# Patient Record
Sex: FEMALE | Race: BLACK OR AFRICAN AMERICAN | NOT HISPANIC OR LATINO
[De-identification: names, ages, dates, MRNs, and addresses within clinical notes are randomized per-mention and may not be internally consistent; named-entity substitution may affect disease eponyms.]

---

## 2022-03-09 ENCOUNTER — APPOINTMENT (OUTPATIENT)
Dept: ANTEPARTUM | Facility: CLINIC | Age: 42
End: 2022-03-09

## 2022-03-09 PROBLEM — Z00.00 ENCOUNTER FOR PREVENTIVE HEALTH EXAMINATION: Status: ACTIVE | Noted: 2022-03-09

## 2022-03-18 ENCOUNTER — ASOB RESULT (OUTPATIENT)
Age: 42
End: 2022-03-18

## 2022-03-18 ENCOUNTER — APPOINTMENT (OUTPATIENT)
Dept: ANTEPARTUM | Facility: CLINIC | Age: 42
End: 2022-03-18
Payer: COMMERCIAL

## 2022-03-18 ENCOUNTER — APPOINTMENT (OUTPATIENT)
Dept: ANTEPARTUM | Facility: CLINIC | Age: 42
End: 2022-03-18

## 2022-03-18 DIAGNOSIS — O28.3 ABNORMAL ULTRASONIC FINDING ON ANTENATAL SCREENING OF MOTHER: ICD-10-CM

## 2022-03-18 PROCEDURE — 76811 OB US DETAILED SNGL FETUS: CPT

## 2022-03-18 PROCEDURE — 99202 OFFICE O/P NEW SF 15 MIN: CPT | Mod: 25

## 2022-03-24 ENCOUNTER — OUTPATIENT (OUTPATIENT)
Dept: OUTPATIENT SERVICES | Age: 42
LOS: 1 days | Discharge: HOME | End: 2022-03-24

## 2022-03-29 ENCOUNTER — APPOINTMENT (OUTPATIENT)
Dept: MATERNAL FETAL MEDICINE | Facility: CLINIC | Age: 42
End: 2022-03-29

## 2022-03-30 ENCOUNTER — APPOINTMENT (OUTPATIENT)
Dept: PEDIATRIC CARDIOLOGY | Facility: CLINIC | Age: 42
End: 2022-03-30

## 2022-03-31 ENCOUNTER — OUTPATIENT (OUTPATIENT)
Dept: OUTPATIENT SERVICES | Age: 42
LOS: 1 days | Discharge: HOME | End: 2022-03-31

## 2022-04-06 ENCOUNTER — APPOINTMENT (OUTPATIENT)
Dept: PEDIATRIC CARDIOLOGY | Facility: CLINIC | Age: 42
End: 2022-04-06
Payer: COMMERCIAL

## 2022-04-06 PROCEDURE — 99241 OFFICE CONSULTATION NEW/ESTAB PATIENT 15 MIN: CPT | Mod: 25

## 2022-04-06 PROCEDURE — 76820 UMBILICAL ARTERY ECHO: CPT

## 2022-04-06 PROCEDURE — 76825 ECHO EXAM OF FETAL HEART: CPT

## 2022-04-06 PROCEDURE — 76821 MIDDLE CEREBRAL ARTERY ECHO: CPT

## 2022-04-06 PROCEDURE — 76827 ECHO EXAM OF FETAL HEART: CPT

## 2022-04-06 PROCEDURE — 93325 DOPPLER ECHO COLOR FLOW MAPG: CPT | Mod: 59

## 2022-04-18 ENCOUNTER — APPOINTMENT (OUTPATIENT)
Dept: ANTEPARTUM | Facility: CLINIC | Age: 42
End: 2022-04-18
Payer: COMMERCIAL

## 2022-04-18 ENCOUNTER — ASOB RESULT (OUTPATIENT)
Age: 42
End: 2022-04-18

## 2022-04-18 PROCEDURE — 76819 FETAL BIOPHYS PROFIL W/O NST: CPT

## 2022-04-18 PROCEDURE — 76816 OB US FOLLOW-UP PER FETUS: CPT

## 2022-05-16 ENCOUNTER — ASOB RESULT (OUTPATIENT)
Age: 42
End: 2022-05-16

## 2022-05-16 ENCOUNTER — APPOINTMENT (OUTPATIENT)
Dept: ANTEPARTUM | Facility: CLINIC | Age: 42
End: 2022-05-16
Payer: COMMERCIAL

## 2022-05-16 PROCEDURE — 76819 FETAL BIOPHYS PROFIL W/O NST: CPT

## 2022-05-16 PROCEDURE — 76816 OB US FOLLOW-UP PER FETUS: CPT

## 2022-05-16 PROCEDURE — 99212 OFFICE O/P EST SF 10 MIN: CPT | Mod: 25

## 2022-05-19 ENCOUNTER — NON-APPOINTMENT (OUTPATIENT)
Age: 42
End: 2022-05-19

## 2022-05-26 ENCOUNTER — APPOINTMENT (OUTPATIENT)
Dept: ANTEPARTUM | Facility: CLINIC | Age: 42
End: 2022-05-26

## 2022-05-26 ENCOUNTER — ASOB RESULT (OUTPATIENT)
Age: 42
End: 2022-05-26

## 2022-05-26 ENCOUNTER — APPOINTMENT (OUTPATIENT)
Dept: MATERNAL FETAL MEDICINE | Facility: CLINIC | Age: 42
End: 2022-05-26
Payer: COMMERCIAL

## 2022-05-26 PROCEDURE — G0108 DIAB MANAGE TRN  PER INDIV: CPT | Mod: 95

## 2022-06-02 ENCOUNTER — OUTPATIENT (OUTPATIENT)
Dept: INPATIENT UNIT | Facility: HOSPITAL | Age: 42
LOS: 1 days | Discharge: HOME | End: 2022-06-02
Payer: MEDICAID

## 2022-06-02 ENCOUNTER — APPOINTMENT (OUTPATIENT)
Dept: ANTEPARTUM | Facility: CLINIC | Age: 42
End: 2022-06-02
Payer: COMMERCIAL

## 2022-06-02 ENCOUNTER — ASOB RESULT (OUTPATIENT)
Age: 42
End: 2022-06-02

## 2022-06-02 VITALS — DIASTOLIC BLOOD PRESSURE: 69 MMHG | HEART RATE: 74 BPM | SYSTOLIC BLOOD PRESSURE: 129 MMHG

## 2022-06-02 VITALS
HEART RATE: 96 BPM | RESPIRATION RATE: 15 BRPM | DIASTOLIC BLOOD PRESSURE: 83 MMHG | TEMPERATURE: 99 F | SYSTOLIC BLOOD PRESSURE: 136 MMHG

## 2022-06-02 DIAGNOSIS — O26.899 OTHER SPECIFIED PREGNANCY RELATED CONDITIONS, UNSPECIFIED TRIMESTER: ICD-10-CM

## 2022-06-02 DIAGNOSIS — Z3A.00 WEEKS OF GESTATION OF PREGNANCY NOT SPECIFIED: ICD-10-CM

## 2022-06-02 LAB
ALBUMIN SERPL ELPH-MCNC: 3.5 G/DL — SIGNIFICANT CHANGE UP (ref 3.3–5)
ALP SERPL-CCNC: 126 U/L — HIGH (ref 40–120)
ALT FLD-CCNC: 11 U/L — SIGNIFICANT CHANGE UP (ref 4–33)
ANION GAP SERPL CALC-SCNC: 13 MMOL/L — SIGNIFICANT CHANGE UP (ref 7–14)
APPEARANCE UR: ABNORMAL
APTT BLD: 24.5 SEC — LOW (ref 27–36.3)
AST SERPL-CCNC: 17 U/L — SIGNIFICANT CHANGE UP (ref 4–32)
BACTERIA # UR AUTO: ABNORMAL
BASOPHILS # BLD AUTO: 0.02 K/UL — SIGNIFICANT CHANGE UP (ref 0–0.2)
BASOPHILS NFR BLD AUTO: 0.2 % — SIGNIFICANT CHANGE UP (ref 0–2)
BILIRUB SERPL-MCNC: <0.2 MG/DL — SIGNIFICANT CHANGE UP (ref 0.2–1.2)
BILIRUB UR-MCNC: NEGATIVE — SIGNIFICANT CHANGE UP
BUN SERPL-MCNC: 5 MG/DL — LOW (ref 7–23)
CALCIUM SERPL-MCNC: 9.7 MG/DL — SIGNIFICANT CHANGE UP (ref 8.4–10.5)
CHLORIDE SERPL-SCNC: 102 MMOL/L — SIGNIFICANT CHANGE UP (ref 98–107)
CO2 SERPL-SCNC: 18 MMOL/L — LOW (ref 22–31)
COLOR SPEC: SIGNIFICANT CHANGE UP
CREAT ?TM UR-MCNC: 82 MG/DL — SIGNIFICANT CHANGE UP
CREAT SERPL-MCNC: 0.53 MG/DL — SIGNIFICANT CHANGE UP (ref 0.5–1.3)
DIFF PNL FLD: ABNORMAL
EGFR: 119 ML/MIN/1.73M2 — SIGNIFICANT CHANGE UP
EOSINOPHIL # BLD AUTO: 0.03 K/UL — SIGNIFICANT CHANGE UP (ref 0–0.5)
EOSINOPHIL NFR BLD AUTO: 0.3 % — SIGNIFICANT CHANGE UP (ref 0–6)
EPI CELLS # UR: 11 /HPF — HIGH (ref 0–5)
FIBRINOGEN PPP-MCNC: 905 MG/DL — HIGH (ref 330–520)
GLUCOSE SERPL-MCNC: 75 MG/DL — SIGNIFICANT CHANGE UP (ref 70–99)
GLUCOSE UR QL: NEGATIVE — SIGNIFICANT CHANGE UP
HCT VFR BLD CALC: 29 % — LOW (ref 34.5–45)
HGB BLD-MCNC: 9.3 G/DL — LOW (ref 11.5–15.5)
HYALINE CASTS # UR AUTO: 1 /LPF — SIGNIFICANT CHANGE UP (ref 0–7)
IANC: 5.85 K/UL — SIGNIFICANT CHANGE UP (ref 1.8–7.4)
IMM GRANULOCYTES NFR BLD AUTO: 0.3 % — SIGNIFICANT CHANGE UP (ref 0–1.5)
INR BLD: 0.97 RATIO — SIGNIFICANT CHANGE UP (ref 0.88–1.16)
KETONES UR-MCNC: NEGATIVE — SIGNIFICANT CHANGE UP
LDH SERPL L TO P-CCNC: 160 U/L — SIGNIFICANT CHANGE UP (ref 135–225)
LEUKOCYTE ESTERASE UR-ACNC: NEGATIVE — SIGNIFICANT CHANGE UP
LYMPHOCYTES # BLD AUTO: 2.9 K/UL — SIGNIFICANT CHANGE UP (ref 1–3.3)
LYMPHOCYTES # BLD AUTO: 29.1 % — SIGNIFICANT CHANGE UP (ref 13–44)
MCHC RBC-ENTMCNC: 26.6 PG — LOW (ref 27–34)
MCHC RBC-ENTMCNC: 32.1 GM/DL — SIGNIFICANT CHANGE UP (ref 32–36)
MCV RBC AUTO: 82.9 FL — SIGNIFICANT CHANGE UP (ref 80–100)
MONOCYTES # BLD AUTO: 1.13 K/UL — HIGH (ref 0–0.9)
MONOCYTES NFR BLD AUTO: 11.3 % — SIGNIFICANT CHANGE UP (ref 2–14)
NEUTROPHILS # BLD AUTO: 5.85 K/UL — SIGNIFICANT CHANGE UP (ref 1.8–7.4)
NEUTROPHILS NFR BLD AUTO: 58.8 % — SIGNIFICANT CHANGE UP (ref 43–77)
NITRITE UR-MCNC: NEGATIVE — SIGNIFICANT CHANGE UP
NRBC # BLD: 0 /100 WBCS — SIGNIFICANT CHANGE UP
NRBC # FLD: 0 K/UL — SIGNIFICANT CHANGE UP
PH UR: 6.5 — SIGNIFICANT CHANGE UP (ref 5–8)
PLATELET # BLD AUTO: 321 K/UL — SIGNIFICANT CHANGE UP (ref 150–400)
POTASSIUM SERPL-MCNC: 3.9 MMOL/L — SIGNIFICANT CHANGE UP (ref 3.5–5.3)
POTASSIUM SERPL-SCNC: 3.9 MMOL/L — SIGNIFICANT CHANGE UP (ref 3.5–5.3)
PROT ?TM UR-MCNC: 21 MG/DL — SIGNIFICANT CHANGE UP
PROT ?TM UR-MCNC: 21 MG/DL — SIGNIFICANT CHANGE UP
PROT SERPL-MCNC: 6.9 G/DL — SIGNIFICANT CHANGE UP (ref 6–8.3)
PROT UR-MCNC: ABNORMAL
PROT/CREAT UR-RTO: 0.3 RATIO — HIGH (ref 0–0.2)
PROTHROM AB SERPL-ACNC: 11.2 SEC — SIGNIFICANT CHANGE UP (ref 10.5–13.4)
RBC # BLD: 3.5 M/UL — LOW (ref 3.8–5.2)
RBC # FLD: 16.1 % — HIGH (ref 10.3–14.5)
RBC CASTS # UR COMP ASSIST: 4 /HPF — SIGNIFICANT CHANGE UP (ref 0–4)
SODIUM SERPL-SCNC: 133 MMOL/L — LOW (ref 135–145)
SP GR SPEC: 1.01 — SIGNIFICANT CHANGE UP (ref 1–1.05)
URATE SERPL-MCNC: 2.6 MG/DL — SIGNIFICANT CHANGE UP (ref 2.5–7)
UROBILINOGEN FLD QL: SIGNIFICANT CHANGE UP
WBC # BLD: 9.96 K/UL — SIGNIFICANT CHANGE UP (ref 3.8–10.5)
WBC # FLD AUTO: 9.96 K/UL — SIGNIFICANT CHANGE UP (ref 3.8–10.5)
WBC UR QL: 8 /HPF — HIGH (ref 0–5)

## 2022-06-02 PROCEDURE — 76818 FETAL BIOPHYS PROFILE W/NST: CPT

## 2022-06-02 PROCEDURE — 76820 UMBILICAL ARTERY ECHO: CPT

## 2022-06-02 PROCEDURE — 59025 FETAL NON-STRESS TEST: CPT | Mod: 26

## 2022-06-02 PROCEDURE — 99213 OFFICE O/P EST LOW 20 MIN: CPT | Mod: 25

## 2022-06-02 NOTE — OB PROVIDER TRIAGE NOTE - NS_FHRLATEDECEL_ALL_ALL_OB
Greater than 2 late decelerations in 30 minutes Less than or equal to 2 late decelerations in 30 minutes

## 2022-06-02 NOTE — OB PROVIDER TRIAGE NOTE - HISTORY OF PRESENT ILLNESS
40 yo , EGA@35 2/7 weeks, presented to D&T from the ATU for prolong monitoring secondary to non reactive NST and Elevated /90 at 11Am.  pt denies contractions, vaginal bleeding, leakage of fluid, and reports fetal movement.  Pt also complained of RUQ pain x 2 days 5/10. Denies fever, chills, headaches, changes in vision, chest pain, palpitations, shortness of breath, cough, nausea, vomiting, diarrhea, constipation, urinary symptoms.     Sono from ATU 22 cephalic presentation, anterior placenta AUNG 5.94, BPP   Prenatal care with Dr. Bates  Prenatal course is complicated with GDMA-1, AMA, CHTN on labetalol 200 mg 2 times/day last dose 0800am    Patient denies signs and symptoms of COVID 19; denies symptomatic illness    No adverse reactions to anesthesia, no objections to blood transfusions if clinically indicated.  OB hx: SAB x 6, 1 with D&C  Med hx: CHTN diagnose 2019, pt stated that she have not been seen by cardiology.   Surg hx: LAp ovarian Cystectomy 2019   GYN hx: denies hx of abnormal papsmearfibroids/STDs  Meds: PNV, ASA 81 mg daily, Labetalo 200 mg PO 2 times daily last dose 0800AM.   Allergies: NKDA    Social hx: Denies alcohol, tobacco, drug use  Psych hx: denies hx of anxiety/depression     40 yo , EGA@35 2/7 weeks, presented to D&T from the ATU for prolong monitoring secondary to non reactive NST and Elevated /90 at 11Am.  pt denies contractions, vaginal bleeding, leakage of fluid, and reports fetal movement.  Pt also complained of RUQ pain x 2 days 5/10. Denies fever, chills, headaches, changes in vision, chest pain, palpitations, shortness of breath, cough, nausea, vomiting, diarrhea, constipation, urinary symptoms.     Sono from ATU 22 cephalic presentation, anterior placenta AUNG 5.94, BPP   Prenatal care with Dr. Bates  Prenatal course is complicated with GDMA-1, AMA, CHTN on labetalol 200 mg 2 times/day last dose 0800am                                                       Persistent right umbilical vein- Fetal echo within normal limit 22.     Patient denies signs and symptoms of COVID 19; denies symptomatic illness    No adverse reactions to anesthesia, no objections to blood transfusions if clinically indicated.  OB hx: SAB x 6, 1 with D&C  Med hx: CHTN diagnose 2019, pt stated that she have not been seen by cardiology.   Surg hx: LAp ovarian Cystectomy 2019   GYN hx: denies hx of abnormal papsmearfibroids/STDs  Meds: PNV, ASA 81 mg daily, Labetalo 200 mg PO 2 times daily last dose 0800AM.   Allergies: NKDA    Social hx: Denies alcohol, tobacco, drug use  Psych hx: denies hx of anxiety/depression

## 2022-06-02 NOTE — OB PROVIDER TRIAGE NOTE - NSHPLABSRESULTS_GEN_ALL_CORE
CBC Full  -  ( 2022 13:23 )  WBC Count : 9.96 K/uL  RBC Count : 3.50 M/uL  Hemoglobin : 9.3 g/dL  Hematocrit : 29.0 %  Platelet Count - Automated : 321 K/uL  Mean Cell Volume : 82.9 fL  Mean Cell Hemoglobin : 26.6 pg  Mean Cell Hemoglobin Concentration : 32.1 gm/dL  Auto Neutrophil # : 5.85 K/uL  Auto Lymphocyte # : 2.90 K/uL  Auto Monocyte # : 1.13 K/uL  Auto Eosinophil # : 0.03 K/uL  Auto Basophil # : 0.02 K/uL  Auto Neutrophil % : 58.8 %  Auto Lymphocyte % : 29.1 %  Auto Monocyte % : 11.3 %  Auto Eosinophil % : 0.3 %  Auto Basophil % : 0.2 %        133<L>  |  102  |  5<L>  ----------------------------<  75  3.9   |  18<L>  |  0.53    Ca    9.7      2022 13:23    TPro  6.9  /  Alb  3.5  /  TBili  <0.2  /  DBili  x   /  AST  17  /  ALT  11  /  AlkPhos  126<H>  02    PT/INR - ( 2022 13:23 )   PT: 11.2 sec;   INR: 0.97 ratio         PTT - ( 2022 13:23 )  PTT:24.5 sec    Urinalysis Basic - ( 2022 13:23 )    Color: Light Yellow / Appearance: Slightly Turbid / S.015 / pH: x  Gluc: x / Ketone: Negative  / Bili: Negative / Urobili: <2 mg/dL   Blood: x / Protein: Trace / Nitrite: Negative   Leuk Esterase: Negative / RBC: 4 /HPF / WBC 8 /HPF   Sq Epi: x / Non Sq Epi: 11 /HPF / Bacteria: Moderate

## 2022-06-02 NOTE — OB PROVIDER TRIAGE NOTE - NSHPPHYSICALEXAM_GEN_ALL_CORE
Vital Signs Last 24 Hrs  T(C): 37.4 (02 Jun 2022 12:54), Max: 37.4 (02 Jun 2022 12:32)  T(F): 99.32 (02 Jun 2022 12:54), Max: 99.32 (02 Jun 2022 12:54)  HR: 90 (02 Jun 2022 13:41) (90 - 96)  BP: 127/70 (02 Jun 2022 13:41) (127/70 - 136/83)  RR: 15 (02 Jun 2022 12:32) (15 - 15)    Gen: NAD  Head: NC/AT  Cardio: RRR  Resp: Clear lung sound bilaterally  Abdomen: Soft, Non tender    NST-  HR baseline, moderate variability, no accelerations, no decelerations, Category 1.  [place]: no Contractions present  SSE, moderate amount of Leukorrhea, Nitrazine negative, ferning negative.   SVE: 1/30/-3 Vital Signs Last 24 Hrs  T(C): 37.4 (02 Jun 2022 12:54), Max: 37.4 (02 Jun 2022 12:32)  T(F): 99.32 (02 Jun 2022 12:54), Max: 99.32 (02 Jun 2022 12:54)  HR: 90 (02 Jun 2022 13:41) (90 - 96)  BP: 127/70 (02 Jun 2022 13:41) (127/70 - 136/83)  RR: 15 (02 Jun 2022 12:32) (15 - 15)    Gen: NAD  Head: NC/AT  Cardio: RRR  Resp: Clear lung sound bilaterally  Abdomen: Soft, Non tender  +1 lower extremity edema.    NST-  HR baseline, moderate variability, no accelerations, no decelerations, Category 1.  [place]: no Contractions present  SSE, moderate amount of Leukorrhea, Nitrazine negative, ferning negative.   SVE: 1/30/-3 Vital Signs Last 24 Hrs  T(C): 37.4 (02 Jun 2022 12:54), Max: 37.4 (02 Jun 2022 12:32)  T(F): 99.32 (02 Jun 2022 12:54), Max: 99.32 (02 Jun 2022 12:54)  HR: 90 (02 Jun 2022 13:41) (90 - 96)  BP: 127/70 (02 Jun 2022 13:41) (127/70 - 136/83)  RR: 15 (02 Jun 2022 12:32) (15 - 15)    Gen: NAD  Head: NC/AT  Cardio: RRR  Resp: Clear lung sound bilaterally  Abdomen: Soft, Non tender  +1 lower extremity edema.    NST-  HR baseline, moderate variability, accelerations 10x10, 1 variable deceleration at 1350 with quick return to baseline, Category 1.  [place]: no Contractions present  SSE, moderate amount of Leukorrhea, Nitrazine negative, ferning negative.   SVE: 1/30/-3

## 2022-06-02 NOTE — OB PROVIDER TRIAGE NOTE - NSOBPROVIDERNOTE_OBGYN_ALL_OB_FT
42 yo , EGA@35 2/7 weeks, R/O PEC, PPROM 42 yo , EGA@35 2/7 weeks, R/O PEC, PPROM  1410 40 yo , EGA@35 2/7 weeks, R/O PEC, PPROM  1410- discuss with Dr. Florence   awaiting full lab report.    1300-tracings review with Dr. Florence/JOSE J.   Discharge patient home with instructions to follow up tomorrow in triage or with Dr. Bates in OB office for repeat NST/BPP .   PEC sign and symptoms reviewed.   Labor precautions and fetal movements count were reviewed  Prior notes, lab results (prenatal chart review, prenatal labs) and recommendations were reviewed;  All ordered tests results reviewed and interpreted.  Plan of care was reviewed with patient and family; patient states understanding of the above plan. 42 yo , EGA@35 2/7 weeks, R/O supra-impose PEC, PPROM  1410- discuss with Dr. Florence   awaiting full lab report.    1300-tracings review with Dr. Florence/JOSE J.   Discharge patient home with instructions to follow up tomorrow in triage or with Dr. Bates in OB office for repeat NST/BPP .   PEC sign and symptoms reviewed.   Labor precautions and fetal movements count were reviewed  Prior notes, lab results (prenatal chart review, prenatal labs) and recommendations were reviewed;  All ordered tests results reviewed and interpreted.  Plan of care was reviewed with patient and family; patient states understanding of the above plan.

## 2022-06-02 NOTE — OB RN TRIAGE NOTE - NSICDXPASTMEDICALHX_GEN_ALL_CORE_FT
Chief complaint:   Chief Complaint   Patient presents with   • Sore Throat     x one day. emesis last night and early this morning, diarrhea this morning       Vitals:  Visit Vitals   • /60   • Pulse 84   • Temp 98.2 °F (36.8 °C) (Tympanic)   • Resp 20   • Wt 79.4 kg   • LMP 04/15/2017 (Exact Date)   • SpO2 98%       HISTORY OF PRESENT ILLNESS     HPI 18-year-old complaining of sore throat started yesterday. Had a fever, low-grade yesterday. Had some chills, had one episode of vomiting yesterday and one episode of diarrhea today. Denies abdominal pain. Complains of some pain with swallowing. Also complains of hoarseness of voice. [employer] classroom has been diagnosed with strep.   Other significant problems:  There are no active problems to display for this patient.      PAST MEDICAL, FAMILY AND SOCIAL HISTORY     Medications:  Current Outpatient Prescriptions   Medication   • topiramate (TOPAMAX) 25 MG tablet     No current facility-administered medications for this visit.        Allergies:  ALLERGIES:   Allergen Reactions   • Penicillins RASH     Hives/Rash       Past Medical  History/Surgeries:  Past Medical History:   Diagnosis Date   • Anxiety    • Depression    • Lactose intolerance     Diet can be modified to tolerate some dairy products   • Right wrist fracture    • Vision, reduced        Past Surgical History:   Procedure Laterality Date   • FINGER SURGERY  00/00/2009    R thumb   • REMOVE TONSILS/ADENOIDS,<11 Y/O  00/00/2003    T & A   • TONSILLECTOMY AND ADENOIDECTOMY         Family History:  Family History   Problem Relation Age of Onset   • Asthma     • Allergies     • Hypertension     • Seizures         Social History:  Social History   Substance Use Topics   • Smoking status: Never Smoker   • Smokeless tobacco: Never Used   • Alcohol use No       REVIEW OF SYSTEMS     Review of Systems   Constitutional: Negative for activity change, appetite change and fatigue.   HENT: Positive for  rhinorrhea, sore throat, trouble swallowing and voice change. Negative for ear pain.    Respiratory: Positive for cough. Negative for shortness of breath and wheezing.    Gastrointestinal: Negative for abdominal pain, anal bleeding and constipation.   Neurological: Negative for light-headedness and headaches.       PHYSICAL EXAM     Physical Exam   Constitutional: She appears well-developed and well-nourished. No distress.   HENT:   Right Ear: External ear normal.   Left Ear: External ear normal.   Mouth/Throat: No oropharyngeal exudate.   Oropharynx moderately inflamed. Nose mildly congested mucosa. External auditory canal, tympanic membranes are normal bilaterally   Neck: Normal range of motion. Neck supple. No thyromegaly present.   Cardiovascular: Normal rate, regular rhythm and normal heart sounds.    Pulmonary/Chest: Effort normal and breath sounds normal. No stridor. No respiratory distress. She has no wheezes. She has no rales.   Lymphadenopathy:     She has no cervical adenopathy.   Skin: She is not diaphoretic.   Nursing note and vitals reviewed.    Walk In on 05/01/2017   Component Date Value   • RAPID STREP GROUP A 05/01/2017 negative          ASSESSMENT/PLAN     Sirena was seen today for sore throat.    Diagnoses and all orders for this visit:    Acute URI  -     STREP GROUP A SCREEN RAPID WITH REFLEX TO CULTURE  -     Strep Group A Culture    Acute pharyngitis, unspecified etiology  -     STREP GROUP A SCREEN RAPID WITH REFLEX TO CULTURE  -     Strep Group A Culture           Plan:  Recommend plenty of fluids, hydration orally. Tylenol, Advil for pain and fever. Rest. Avoid undue exertion. Watch for any increasing/worsening symptoms.  Recommendation to follow with the primary care physician . If the symptoms worsen at any point, recommend patient go to the emergency room.     Dr. Vikash Yi M.D.  [facility]  [address] [address] [place] [address]  Telephone:  [phone]         PAST MEDICAL HISTORY:  Hypertension dx 2019

## 2022-06-02 NOTE — OB RN TRIAGE NOTE - NSMATERNALFETALCONCERNS_OBGYN_ALL_OB_FT
MATERNAL ALERT  AMA with primary hypertension   PRUV on ATU scan (3/18/2022)  Fany Murphy RN 3/18/2022  NORMAL FETAL ECHO

## 2022-06-02 NOTE — OB RN TRIAGE NOTE - NS_TRIAGEMEDICALSCREENINGPERFORMEDBY_OBGYN_ALL_OB_FT
CBC and blood cultures today    Flush poor    Norco 1 tablet every 4 hours as needed  
helder john/nathaniel

## 2022-06-02 NOTE — OB RN TRIAGE NOTE - FALL HARM RISK - UNIVERSAL INTERVENTIONS
Bed in lowest position, wheels locked, appropriate side rails in place/Call bell, personal items and telephone in reach/Instruct patient to call for assistance before getting out of bed or chair/Non-slip footwear when patient is out of bed/[place] to call system/Physically safe environment - no spills, clutter or unnecessary equipment/Purposeful Proactive Rounding/Room/bathroom lighting operational, light cord in reach

## 2022-06-02 NOTE — OB PROVIDER TRIAGE NOTE - ADDITIONAL INSTRUCTIONS
please drink 1-2 litter of fluid per day  follow up tomorrow in triage or with Dr. Bates in OB office for repeat NST/BPP .

## 2022-06-03 ENCOUNTER — OUTPATIENT (OUTPATIENT)
Dept: OUTPATIENT SERVICES | Facility: HOSPITAL | Age: 42
LOS: 1 days | Discharge: HOME | End: 2022-06-03

## 2022-06-03 VITALS
DIASTOLIC BLOOD PRESSURE: 78 MMHG | HEART RATE: 89 BPM | SYSTOLIC BLOOD PRESSURE: 140 MMHG | RESPIRATION RATE: 17 BRPM | TEMPERATURE: 99 F

## 2022-06-03 DIAGNOSIS — O26.899 OTHER SPECIFIED PREGNANCY RELATED CONDITIONS, UNSPECIFIED TRIMESTER: ICD-10-CM

## 2022-06-03 DIAGNOSIS — Z3A.00 WEEKS OF GESTATION OF PREGNANCY NOT SPECIFIED: ICD-10-CM

## 2022-06-03 PROCEDURE — 99213 OFFICE O/P EST LOW 20 MIN: CPT | Mod: 25

## 2022-06-03 PROCEDURE — 76818 FETAL BIOPHYS PROFILE W/NST: CPT | Mod: 26

## 2022-06-03 PROCEDURE — 59025 FETAL NON-STRESS TEST: CPT | Mod: 26,59

## 2022-06-03 PROCEDURE — 76815 OB US LIMITED FETUS(S): CPT | Mod: 26

## 2022-06-03 NOTE — OB PROVIDER TRIAGE NOTE - NSHPPHYSICALEXAM_GEN_ALL_CORE
Vital Signs Last 24 Hrs  T(C): 37 (03 Jun 2022 11:40), Max: 37 (03 Jun 2022 11:40)  T(F): 98.6 (03 Jun 2022 11:40), Max: 98.6 (03 Jun 2022 11:40)  HR: 85 (03 Jun 2022 13:52) (85 - 89)  BP: 131/69 (03 Jun 2022 13:52) (131/69 - 140/78)  RR: 17 (03 Jun 2022 11:40) (17 - 17)    Abdomen gravid, soft and nontender  NST - reactive  TAS - vtx/ant  plac/kinga 6.26. largest pocket 3.24 x2.24  BPP - 8/8

## 2022-06-03 NOTE — OB PROVIDER TRIAGE NOTE - HISTORY OF PRESENT ILLNESS
PNC Provider:  Paulo.  EDC:  7/5/2022  Patient is a 40y/o G 7  @ 35 3/7wks gest who is returning to D&T for AUNG evaluation.  Denies contractions, LOF, or vaginal bleeding.  Reports good fetal movements.    AP Course:  GDMA1, cHTN  Meds: Labetalol 200mg bid, ASA 81mg and PNV  NKDA    PMHx - cHTN, dx'ed 2019  PSHx/Gyn - Ovarian cystectectomy -  OBHx - SAB x 6; D&C x 1  SH/Psych - denies   PNC Provider:  Paulo.  EDC:  7/5/2022  Patient is a 40y/o G 7  @ 35 3/7wks gest who is returning to D&T for AUNG evaluation.  Denies contractions, LOF, or vaginal bleeding.  Reports good fetal movements.    AP Course:  GDMA1.  PRUV  Meds: Labetalol 200mg bid, ASA 81mg and PNV  NKDA    PMHx - cHTN, dx'ed 2019  PSHx/Gyn - Ovarian cystectomy   OBHx - SAB x 6; D&C x 1  SH/Psych - denies

## 2022-06-03 NOTE — OB PROVIDER TRIAGE NOTE - NSOBPROVIDERNOTE_OBGYN_ALL_OB_FT
Next PN appt. scheduled for 6/4/2022.    Discussed findings with   Plan: Next PN appt. scheduled for 6/4/2022.    Discussed findings with Dr. Florence  Plan:  patient is cleared for discharge home.  Discussed fetal movements and encouraged to keep appt.s as scheduled.

## 2022-06-06 ENCOUNTER — INPATIENT (INPATIENT)
Facility: HOSPITAL | Age: 42
LOS: 3 days | Discharge: HOME | End: 2022-06-10
Attending: OBSTETRICS & GYNECOLOGY | Admitting: OBSTETRICS & GYNECOLOGY

## 2022-06-06 ENCOUNTER — TRANSCRIPTION ENCOUNTER (OUTPATIENT)
Age: 42
End: 2022-06-06

## 2022-06-06 ENCOUNTER — APPOINTMENT (OUTPATIENT)
Dept: ANTEPARTUM | Facility: CLINIC | Age: 42
End: 2022-06-06
Payer: COMMERCIAL

## 2022-06-06 ENCOUNTER — ASOB RESULT (OUTPATIENT)
Age: 42
End: 2022-06-06

## 2022-06-06 ENCOUNTER — RESULT REVIEW (OUTPATIENT)
Age: 42
End: 2022-06-06

## 2022-06-06 VITALS
RESPIRATION RATE: 17 BRPM | DIASTOLIC BLOOD PRESSURE: 72 MMHG | SYSTOLIC BLOOD PRESSURE: 137 MMHG | TEMPERATURE: 99 F | HEART RATE: 88 BPM

## 2022-06-06 DIAGNOSIS — O41.03X0 OLIGOHYDRAMNIOS, THIRD TRIMESTER, NOT APPLICABLE OR UNSPECIFIED: ICD-10-CM

## 2022-06-06 DIAGNOSIS — O26.899 OTHER SPECIFIED PREGNANCY RELATED CONDITIONS, UNSPECIFIED TRIMESTER: ICD-10-CM

## 2022-06-06 DIAGNOSIS — Z3A.00 WEEKS OF GESTATION OF PREGNANCY NOT SPECIFIED: ICD-10-CM

## 2022-06-06 LAB
ALBUMIN SERPL ELPH-MCNC: 3.4 G/DL — SIGNIFICANT CHANGE UP (ref 3.3–5)
ALP SERPL-CCNC: 129 U/L — HIGH (ref 40–120)
ALT FLD-CCNC: 15 U/L — SIGNIFICANT CHANGE UP (ref 4–33)
ANION GAP SERPL CALC-SCNC: 13 MMOL/L — SIGNIFICANT CHANGE UP (ref 7–14)
APPEARANCE UR: ABNORMAL
APTT BLD: 25.3 SEC — LOW (ref 27–36.3)
AST SERPL-CCNC: 16 U/L — SIGNIFICANT CHANGE UP (ref 4–32)
BACTERIA # UR AUTO: ABNORMAL
BASOPHILS # BLD AUTO: 0.02 K/UL — SIGNIFICANT CHANGE UP (ref 0–0.2)
BASOPHILS NFR BLD AUTO: 0.2 % — SIGNIFICANT CHANGE UP (ref 0–2)
BILIRUB SERPL-MCNC: <0.2 MG/DL — SIGNIFICANT CHANGE UP (ref 0.2–1.2)
BILIRUB UR-MCNC: NEGATIVE — SIGNIFICANT CHANGE UP
BLD GP AB SCN SERPL QL: NEGATIVE — SIGNIFICANT CHANGE UP
BUN SERPL-MCNC: 4 MG/DL — LOW (ref 7–23)
CALCIUM SERPL-MCNC: 9.3 MG/DL — SIGNIFICANT CHANGE UP (ref 8.4–10.5)
CHLORIDE SERPL-SCNC: 104 MMOL/L — SIGNIFICANT CHANGE UP (ref 98–107)
CO2 SERPL-SCNC: 20 MMOL/L — LOW (ref 22–31)
COLLECT DURATION TIME UR: 24 HR — SIGNIFICANT CHANGE UP
COLOR SPEC: SIGNIFICANT CHANGE UP
COVID-19 SPIKE DOMAIN AB INTERP: POSITIVE
COVID-19 SPIKE DOMAIN ANTIBODY RESULT: >250 U/ML — HIGH
CREAT ?TM UR-MCNC: 73 MG/DL — SIGNIFICANT CHANGE UP
CREAT SERPL-MCNC: 0.57 MG/DL — SIGNIFICANT CHANGE UP (ref 0.5–1.3)
DIFF PNL FLD: ABNORMAL
EGFR: 117 ML/MIN/1.73M2 — SIGNIFICANT CHANGE UP
EOSINOPHIL # BLD AUTO: 0.03 K/UL — SIGNIFICANT CHANGE UP (ref 0–0.5)
EOSINOPHIL NFR BLD AUTO: 0.4 % — SIGNIFICANT CHANGE UP (ref 0–6)
EPI CELLS # UR: 16 /HPF — HIGH (ref 0–5)
FIBRINOGEN PPP-MCNC: 803 MG/DL — HIGH (ref 330–520)
GLUCOSE BLDC GLUCOMTR-MCNC: 72 MG/DL — SIGNIFICANT CHANGE UP (ref 70–99)
GLUCOSE BLDC GLUCOMTR-MCNC: 82 MG/DL — SIGNIFICANT CHANGE UP (ref 70–99)
GLUCOSE SERPL-MCNC: 69 MG/DL — LOW (ref 70–99)
GLUCOSE UR QL: NEGATIVE — SIGNIFICANT CHANGE UP
HBV SURFACE AG SERPL QL IA: SIGNIFICANT CHANGE UP
HCT VFR BLD CALC: 27.6 % — LOW (ref 34.5–45)
HGB BLD-MCNC: 8.7 G/DL — LOW (ref 11.5–15.5)
HYALINE CASTS # UR AUTO: 3 /LPF — SIGNIFICANT CHANGE UP (ref 0–7)
IANC: 4.42 K/UL — SIGNIFICANT CHANGE UP (ref 1.8–7.4)
IMM GRANULOCYTES NFR BLD AUTO: 0.4 % — SIGNIFICANT CHANGE UP (ref 0–1.5)
INR BLD: 0.98 RATIO — SIGNIFICANT CHANGE UP (ref 0.88–1.16)
KETONES UR-MCNC: ABNORMAL
LDH SERPL L TO P-CCNC: 158 U/L — SIGNIFICANT CHANGE UP (ref 135–225)
LEUKOCYTE ESTERASE UR-ACNC: NEGATIVE — SIGNIFICANT CHANGE UP
LYMPHOCYTES # BLD AUTO: 2.83 K/UL — SIGNIFICANT CHANGE UP (ref 1–3.3)
LYMPHOCYTES # BLD AUTO: 34.6 % — SIGNIFICANT CHANGE UP (ref 13–44)
MCHC RBC-ENTMCNC: 26.3 PG — LOW (ref 27–34)
MCHC RBC-ENTMCNC: 31.5 GM/DL — LOW (ref 32–36)
MCV RBC AUTO: 83.4 FL — SIGNIFICANT CHANGE UP (ref 80–100)
MONOCYTES # BLD AUTO: 0.86 K/UL — SIGNIFICANT CHANGE UP (ref 0–0.9)
MONOCYTES NFR BLD AUTO: 10.5 % — SIGNIFICANT CHANGE UP (ref 2–14)
NEUTROPHILS # BLD AUTO: 4.42 K/UL — SIGNIFICANT CHANGE UP (ref 1.8–7.4)
NEUTROPHILS NFR BLD AUTO: 53.9 % — SIGNIFICANT CHANGE UP (ref 43–77)
NITRITE UR-MCNC: NEGATIVE — SIGNIFICANT CHANGE UP
NRBC # BLD: 0 /100 WBCS — SIGNIFICANT CHANGE UP
NRBC # FLD: 0 K/UL — SIGNIFICANT CHANGE UP
PH UR: 6.5 — SIGNIFICANT CHANGE UP (ref 5–8)
PLATELET # BLD AUTO: 316 K/UL — SIGNIFICANT CHANGE UP (ref 150–400)
POTASSIUM SERPL-MCNC: 3.9 MMOL/L — SIGNIFICANT CHANGE UP (ref 3.5–5.3)
POTASSIUM SERPL-SCNC: 3.9 MMOL/L — SIGNIFICANT CHANGE UP (ref 3.5–5.3)
PROT 24H UR-MRATE: 454 MG/24 H — HIGH
PROT ?TM UR-MCNC: 26 MG/DL — SIGNIFICANT CHANGE UP
PROT ?TM UR-MCNC: 26 MG/DL — SIGNIFICANT CHANGE UP
PROT SERPL-MCNC: 6.6 G/DL — SIGNIFICANT CHANGE UP (ref 6–8.3)
PROT UR-MCNC: ABNORMAL
PROT/CREAT UR-RTO: 0.4 RATIO — HIGH (ref 0–0.2)
PROTHROM AB SERPL-ACNC: 11.4 SEC — SIGNIFICANT CHANGE UP (ref 10.5–13.4)
RBC # BLD: 3.31 M/UL — LOW (ref 3.8–5.2)
RBC # FLD: 16.3 % — HIGH (ref 10.3–14.5)
RBC CASTS # UR COMP ASSIST: 2 /HPF — SIGNIFICANT CHANGE UP (ref 0–4)
RH IG SCN BLD-IMP: POSITIVE — SIGNIFICANT CHANGE UP
RH IG SCN BLD-IMP: POSITIVE — SIGNIFICANT CHANGE UP
SARS-COV-2 IGG+IGM SERPL QL IA: >250 U/ML — HIGH
SARS-COV-2 IGG+IGM SERPL QL IA: POSITIVE
SARS-COV-2 RNA SPEC QL NAA+PROBE: SIGNIFICANT CHANGE UP
SODIUM SERPL-SCNC: 137 MMOL/L — SIGNIFICANT CHANGE UP (ref 135–145)
SP GR SPEC: 1.01 — SIGNIFICANT CHANGE UP (ref 1–1.05)
TOTAL VOLUME - 24 HOUR: 2100 ML — SIGNIFICANT CHANGE UP
URATE SERPL-MCNC: 3.1 MG/DL — SIGNIFICANT CHANGE UP (ref 2.5–7)
URINE CREATININE CALCULATION: 1.4 G/24 H — SIGNIFICANT CHANGE UP (ref 0.6–1.6)
UROBILINOGEN FLD QL: SIGNIFICANT CHANGE UP
WBC # BLD: 8.19 K/UL — SIGNIFICANT CHANGE UP (ref 3.8–10.5)
WBC # FLD AUTO: 8.19 K/UL — SIGNIFICANT CHANGE UP (ref 3.8–10.5)
WBC UR QL: 5 /HPF — SIGNIFICANT CHANGE UP (ref 0–5)

## 2022-06-06 PROCEDURE — 76820 UMBILICAL ARTERY ECHO: CPT

## 2022-06-06 PROCEDURE — 88307 TISSUE EXAM BY PATHOLOGIST: CPT | Mod: 26

## 2022-06-06 PROCEDURE — 76816 OB US FOLLOW-UP PER FETUS: CPT

## 2022-06-06 PROCEDURE — 88305 TISSUE EXAM BY PATHOLOGIST: CPT | Mod: 26

## 2022-06-06 PROCEDURE — 76818 FETAL BIOPHYS PROFILE W/NST: CPT

## 2022-06-06 PROCEDURE — 99213 OFFICE O/P EST LOW 20 MIN: CPT | Mod: 25

## 2022-06-06 DEVICE — SURGICEL SNOW 2 X 4": Type: IMPLANTABLE DEVICE | Status: FUNCTIONAL

## 2022-06-06 RX ORDER — ACETAMINOPHEN 500 MG
975 TABLET ORAL
Refills: 0 | Status: DISCONTINUED | OUTPATIENT
Start: 2022-06-07 | End: 2022-06-10

## 2022-06-06 RX ORDER — KETOROLAC TROMETHAMINE 30 MG/ML
30 SYRINGE (ML) INJECTION EVERY 6 HOURS
Refills: 0 | Status: DISCONTINUED | OUTPATIENT
Start: 2022-06-07 | End: 2022-06-07

## 2022-06-06 RX ORDER — MAGNESIUM HYDROXIDE 400 MG/1
30 TABLET, CHEWABLE ORAL
Refills: 0 | Status: DISCONTINUED | OUTPATIENT
Start: 2022-06-07 | End: 2022-06-10

## 2022-06-06 RX ORDER — LABETALOL HCL 100 MG
20 TABLET ORAL ONCE
Refills: 0 | Status: COMPLETED | OUTPATIENT
Start: 2022-06-06 | End: 2022-06-06

## 2022-06-06 RX ORDER — AMPICILLIN TRIHYDRATE 250 MG
1 CAPSULE ORAL EVERY 4 HOURS
Refills: 0 | Status: DISCONTINUED | OUTPATIENT
Start: 2022-06-06 | End: 2022-06-06

## 2022-06-06 RX ORDER — OXYCODONE HYDROCHLORIDE 5 MG/1
5 TABLET ORAL
Refills: 0 | Status: DISCONTINUED | OUTPATIENT
Start: 2022-06-07 | End: 2022-06-10

## 2022-06-06 RX ORDER — SODIUM CHLORIDE 9 MG/ML
1000 INJECTION, SOLUTION INTRAVENOUS
Refills: 0 | Status: DISCONTINUED | OUTPATIENT
Start: 2022-06-06 | End: 2022-06-06

## 2022-06-06 RX ORDER — DIPHENHYDRAMINE HCL 50 MG
25 CAPSULE ORAL EVERY 6 HOURS
Refills: 0 | Status: DISCONTINUED | OUTPATIENT
Start: 2022-06-07 | End: 2022-06-10

## 2022-06-06 RX ORDER — NALOXONE HYDROCHLORIDE 4 MG/.1ML
0.1 SPRAY NASAL
Refills: 0 | Status: DISCONTINUED | OUTPATIENT
Start: 2022-06-07 | End: 2022-06-10

## 2022-06-06 RX ORDER — TETANUS TOXOID, REDUCED DIPHTHERIA TOXOID AND ACELLULAR PERTUSSIS VACCINE, ADSORBED 5; 2.5; 8; 8; 2.5 [IU]/.5ML; [IU]/.5ML; UG/.5ML; UG/.5ML; UG/.5ML
0.5 SUSPENSION INTRAMUSCULAR ONCE
Refills: 0 | Status: DISCONTINUED | OUTPATIENT
Start: 2022-06-07 | End: 2022-06-10

## 2022-06-06 RX ORDER — MAGNESIUM SULFATE 500 MG/ML
2 VIAL (ML) INJECTION
Qty: 40 | Refills: 0 | Status: DISCONTINUED | OUTPATIENT
Start: 2022-06-06 | End: 2022-06-06

## 2022-06-06 RX ORDER — OXYTOCIN 10 UNIT/ML
333.33 VIAL (ML) INJECTION
Qty: 20 | Refills: 0 | Status: DISCONTINUED | OUTPATIENT
Start: 2022-06-07 | End: 2022-06-08

## 2022-06-06 RX ORDER — DEXAMETHASONE 0.5 MG/5ML
4 ELIXIR ORAL EVERY 6 HOURS
Refills: 0 | Status: DISCONTINUED | OUTPATIENT
Start: 2022-06-07 | End: 2022-06-10

## 2022-06-06 RX ORDER — OXYCODONE HYDROCHLORIDE 5 MG/1
5 TABLET ORAL ONCE
Refills: 0 | Status: DISCONTINUED | OUTPATIENT
Start: 2022-06-07 | End: 2022-06-10

## 2022-06-06 RX ORDER — OXYTOCIN 10 UNIT/ML
16.67 VIAL (ML) INJECTION
Qty: 20 | Refills: 0 | Status: DISCONTINUED | OUTPATIENT
Start: 2022-06-06 | End: 2022-06-08

## 2022-06-06 RX ORDER — HEPARIN SODIUM 5000 [USP'U]/ML
5000 INJECTION INTRAVENOUS; SUBCUTANEOUS EVERY 12 HOURS
Refills: 0 | Status: DISCONTINUED | OUTPATIENT
Start: 2022-06-07 | End: 2022-06-10

## 2022-06-06 RX ORDER — SIMETHICONE 80 MG/1
80 TABLET, CHEWABLE ORAL EVERY 4 HOURS
Refills: 0 | Status: DISCONTINUED | OUTPATIENT
Start: 2022-06-07 | End: 2022-06-10

## 2022-06-06 RX ORDER — MAGNESIUM SULFATE 500 MG/ML
4 VIAL (ML) INJECTION ONCE
Refills: 0 | Status: COMPLETED | OUTPATIENT
Start: 2022-06-06 | End: 2022-06-06

## 2022-06-06 RX ORDER — OXYCODONE HYDROCHLORIDE 5 MG/1
10 TABLET ORAL
Refills: 0 | Status: DISCONTINUED | OUTPATIENT
Start: 2022-06-07 | End: 2022-06-07

## 2022-06-06 RX ORDER — LABETALOL HCL 100 MG
200 TABLET ORAL
Refills: 0 | Status: DISCONTINUED | OUTPATIENT
Start: 2022-06-06 | End: 2022-06-06

## 2022-06-06 RX ORDER — AMPICILLIN TRIHYDRATE 250 MG
2 CAPSULE ORAL ONCE
Refills: 0 | Status: COMPLETED | OUTPATIENT
Start: 2022-06-06 | End: 2022-06-06

## 2022-06-06 RX ORDER — OXYCODONE HYDROCHLORIDE 5 MG/1
5 TABLET ORAL
Refills: 0 | Status: DISCONTINUED | OUTPATIENT
Start: 2022-06-07 | End: 2022-06-08

## 2022-06-06 RX ORDER — LANOLIN
1 OINTMENT (GRAM) TOPICAL EVERY 6 HOURS
Refills: 0 | Status: DISCONTINUED | OUTPATIENT
Start: 2022-06-07 | End: 2022-06-10

## 2022-06-06 RX ORDER — NALBUPHINE HYDROCHLORIDE 10 MG/ML
2.5 INJECTION, SOLUTION INTRAMUSCULAR; INTRAVENOUS; SUBCUTANEOUS EVERY 6 HOURS
Refills: 0 | Status: DISCONTINUED | OUTPATIENT
Start: 2022-06-07 | End: 2022-06-10

## 2022-06-06 RX ORDER — ONDANSETRON 8 MG/1
4 TABLET, FILM COATED ORAL EVERY 6 HOURS
Refills: 0 | Status: DISCONTINUED | OUTPATIENT
Start: 2022-06-07 | End: 2022-06-10

## 2022-06-06 RX ORDER — IBUPROFEN 200 MG
600 TABLET ORAL EVERY 6 HOURS
Refills: 0 | Status: COMPLETED | OUTPATIENT
Start: 2022-06-07 | End: 2023-05-06

## 2022-06-06 RX ORDER — SODIUM CHLORIDE 9 MG/ML
3 INJECTION INTRAMUSCULAR; INTRAVENOUS; SUBCUTANEOUS EVERY 8 HOURS
Refills: 0 | Status: DISCONTINUED | OUTPATIENT
Start: 2022-06-06 | End: 2022-06-06

## 2022-06-06 RX ADMIN — Medication 300 GRAM(S): at 18:06

## 2022-06-06 RX ADMIN — Medication 108 GRAM(S): at 18:42

## 2022-06-06 RX ADMIN — Medication 216 GRAM(S): at 14:55

## 2022-06-06 RX ADMIN — Medication 200 MILLIGRAM(S): at 20:03

## 2022-06-06 RX ADMIN — Medication 50 GM/HR: at 19:10

## 2022-06-06 RX ADMIN — Medication 20 MILLIGRAM(S): at 17:58

## 2022-06-06 RX ADMIN — Medication 50 GM/HR: at 18:32

## 2022-06-06 NOTE — OB PROVIDER LABOR PROGRESS NOTE - NS_OBIHIFHRDETAILS_OBGYN_ALL_OB_FT
145, mod, - accels, recurrent late decels
Cat 1: 145/mod variability/ - accels/ - decels
Cat 1: 145/mod variability/ + accels/- decel

## 2022-06-06 NOTE — OB NEONATOLOGY/PEDIATRICIAN DELIVERY SUMMARY - NSMATERNALFETALCONCERNS_OBGYN_ALL_OB_FT
MATERNAL ALERT  AMA with primary hypertension   PRUV on ATU scan (3/18/2022)  Fany Murphy RN 3/18/2022  NORMAL FETAL ECHO     Show Quadrant Variables In The Stage Tabs: Yes

## 2022-06-06 NOTE — OB NEONATOLOGY/PEDIATRICIAN DELIVERY SUMMARY - NSPEDSNEONOTESA_OBGYN_ALL_OB_FT
Baby is a 35+6 wk GA Male born to a 42 y/o  mother via C/S for anhydramnios. Maternal history notable for cHTN (on labetalol 200 BID), PEC (s/p Mag x2), GDMA1, AMA (on Aspirin), SAB x6. Prenatal history notable for IUGR (<10th %ile), oligohydramnios (AUNG=0, BPP 8/8) , NRFHT. Maternal BT O+. PNL neg, NR, and Rubella pending. GBS unknown, treated with Amp x2 >2h prior to delivery. AROM at delivery, clear fluids. Baby born crying spontaneously. WDSS. Apgars 7/9. EOS 0.05. Mom plans to breastfeed, would like hepB vaccination for child. COVID status neg.     Physical Exam (Post-Delivery)  Gen: NAD; well-appearing  HEENT: NC/AT; anterior fontanelle open and flat; ears and nose clinically patent, normally set; no tags, no cleft palate appreciated  Skin: pink, warm, well-perfused, no rash; +congenital dermal melanocytosis  Resp: non-labored breathing  Abd: soft, NT/ND; no masses appreciated, umbilical cord with 3 vessels  Extremities: moving all extremities, no crepitus; hips negative O/B  MSK: no clavicular fracture appreciated  : Male Mike I; no abnormalities; anus patent  Back: no sacral dimple  Neuro: +gertrude, +babinski, grasp, good tone throughout

## 2022-06-06 NOTE — OB PROVIDER LABOR PROGRESS NOTE - ASSESSMENT
40yo @35+6 IOL for anhydramnios and IUGR AC5%    - SVE: 0/50/-3  - CB attempted unsuccessfully. Cervix midline and low, will re-attempt when 0.5-1cm  - FHT Cat 1, reassuring, ctm  - c/w BC    d/w Dr. Yosef Joe, PGY1  
42yo  IOL for anhydramnios and IUGR    - SVE: 0.5/50/-3  - CB placed at 5:30p  - c/w buccal cytotec  - Repeat BP 15min after severe of 160/71. If still severe range, will start Mg and give IVP of BP medication    d/w Dr. Yosef Joe, PGY1
IOL Anhydramnios, IUGR, siPEC    - CB removed 2/2 category 2 tracing  - continue resuscitation    SABINE Bird, PGY-3  d/w Dr. Navas

## 2022-06-06 NOTE — OB PROVIDER TRIAGE NOTE - HISTORY OF PRESENT ILLNESS
42yo Black female  @ 35.6 wks SLIUP GDMA1 with hx CHTN since 2019 followed by ATU for persistent low AUNG here from ATU for evaluation of non-rx NST. Pt reports GFM, denies LOF/ VB/ ctx's. Pt has been here for rule out PPROM.     Pmhx-CHTN since 2019  Pshx/Hos-DVC x 1; ovarian cystectomy  Meds-PNV; labetalol 200mg BID; ASA 81 mg  NKDA  Gyn-ovarian cystectomy  Past ob-SAB x 6 with DVC x 1  Soc-denies

## 2022-06-06 NOTE — OB PROVIDER TRIAGE NOTE - NSHPPHYSICALEXAM_GEN_ALL_CORE
Gen: A&O x 3; NAD  Vitals: BP-137/72; 136/75; 145/77    Pulm-CTA B/L; no wheezes  Cor-clear S1S2  Abd exam-soft and nontender    NST cat II with 140 baseline with mod variability; +deceleration {reviewed by Dr Nunes}    ATU sono-vtx; R lateral placenta; AUNG-4.15 (largest pocket 4.15); 2339g (5#3; 10%-ile); 8/8 BPP    13:27-Dr Nunes and Dr Hirschberg in to see pt. Rpt sono reveals oligohydramnios with no 2 x 2 pocket

## 2022-06-06 NOTE — OB RN INTRAOPERATIVE NOTE - NSSELHIDDEN_OBGYN_ALL_OB_FT
[NS_[email or web address]_ALL_OB_FT:[id number]],[NS_DeliveryAssist1_OBGYN_ALL_OB_FT:[id number]],[NS_DeliveryRN_OBGYN_ALL_OB_FT:[id number]]

## 2022-06-06 NOTE — OB RN DELIVERY SUMMARY - NSSELHIDDEN_OBGYN_ALL_OB_FT
[NS_[email or web address]_ALL_OB_FT:[id number]],[NS_DeliveryAssist1_OBGYN_ALL_OB_FT:[id number]],[NS_DeliveryRN_OBGYN_ALL_OB_FT:[id number]=]

## 2022-06-06 NOTE — OB PROVIDER LABOR PROGRESS NOTE - NS_SUBJECTIVE/OBJECTIVE_OBGYN_ALL_OB_FT
patient examined for cat 2 tracing
CB placed successfully. During placement patient had a severe 182/79 which was likely from pain. However repeat BP a few minutes later still elevated at 160/71.
Cervical balloon induction explained to patient who understands and accepts. Cervical balloon attempted unsuccessfully. Patient received BC#1 30 min ago

## 2022-06-06 NOTE — OB RN TRIAGE NOTE - FALL HARM RISK - UNIVERSAL INTERVENTIONS
Bed in lowest position, wheels locked, appropriate side rails in place/Call bell, personal items and telephone in reach/Instruct patient to call for assistance before getting out of bed or chair/Non-slip footwear when patient is out of bed/[place] to call system/Physically safe environment - no spills, clutter or unnecessary equipment/Purposeful Proactive Rounding/Room/bathroom lighting operational, light cord in reach

## 2022-06-06 NOTE — OB PROVIDER H&P - ASSESSMENT
40yo Black female  @ 35.6 wks SLIUP GDMA1 with CHTN here with oligohydramnios and cat II NST  -pt was dw Dr Nunes of [facility] and Dr Navas  -pt was ordered for HELLP labs; pt brought her 24h urine that was sent to lab  -pt was dw Dr Navas  -pt for buccal cytotec  -pt is  with unknown GBS->pt ordered for ampicillin  -Dr Bates office called for records  -pt was swabbed for covid-19   40yo Black female  @ 35.6 wks SLIUP GDMA1 with CHTN here with oligohydramnios and cat II NST  -pt was dw Dr Nunes of [facility] and Dr Navas  -pt was ordered for HELLP labs; pt brought her 24h urine that was sent to lab  -pt was dw Dr Navas  -pt for buccal cytotec  -pt is  with unknown GBS->pt ordered for ampicillin  -Dr Bates office called for records  -pt was swabbed for covid-19    Ob attending    for admission and delivery per [facility]  buccal cytotec with cervical balloon when able  pain management  ampicillin  labetalol 200 mg BID    Becky CUELLAR

## 2022-06-06 NOTE — OB RN DELIVERY SUMMARY - NS_SEPSISRSKCALC_OBGYN_ALL_OB_FT
EOS calculated successfully. EOS Risk Factor: 0.5/1000 live births ([facility] national incidence); GA=35w6d; Temp=98.8; ROM=0.033; GBS='Unknown'; Antibiotics='GBS specific antibiotics > 2 hrs prior to birth'

## 2022-06-06 NOTE — OB PROVIDER TRIAGE NOTE - NSOBPROVIDERNOTE_OBGYN_ALL_OB_FT
42yo Black female  @ 35.6 wks SLIUP GDMA1 with CHTN here with oligohydramnios and cat II NST  -pt was dw Dr Nunes of [facility] and Dr Navas  -pt was ordered for HELLP labs; pt brought her 24h urine that was sent to lab  -pt was dw Dr Navas  -pt for buccal cytotec  -pt is  with unknown GBS->pt ordered for ampicillin  -Dr Bates office called for records  -pt was swabbed for covid-19

## 2022-06-06 NOTE — OB RN PATIENT PROFILE - FALL HARM RISK - UNIVERSAL INTERVENTIONS
Bed in lowest position, wheels locked, appropriate side rails in place/Call bell, personal items and telephone in reach/Instruct patient to call for assistance before getting out of bed or chair/Non-slip footwear when patient is out of bed/[place] to call system/Physically safe environment - no spills, clutter or unnecessary equipment/Purposeful Proactive Rounding/Room/bathroom lighting operational, light cord in reach

## 2022-06-07 LAB
ALBUMIN SERPL ELPH-MCNC: 2.8 G/DL — LOW (ref 3.3–5)
ALBUMIN SERPL ELPH-MCNC: 2.8 G/DL — LOW (ref 3.3–5)
ALP SERPL-CCNC: 108 U/L — SIGNIFICANT CHANGE UP (ref 40–120)
ALP SERPL-CCNC: 115 U/L — SIGNIFICANT CHANGE UP (ref 40–120)
ALT FLD-CCNC: 7 U/L — SIGNIFICANT CHANGE UP (ref 4–33)
ALT FLD-CCNC: 8 U/L — SIGNIFICANT CHANGE UP (ref 4–33)
ANION GAP SERPL CALC-SCNC: 10 MMOL/L — SIGNIFICANT CHANGE UP (ref 7–14)
ANION GAP SERPL CALC-SCNC: 12 MMOL/L — SIGNIFICANT CHANGE UP (ref 7–14)
APTT BLD: 23.6 SEC — LOW (ref 27–36.3)
APTT BLD: 24.3 SEC — LOW (ref 27–36.3)
AST SERPL-CCNC: 19 U/L — SIGNIFICANT CHANGE UP (ref 4–32)
AST SERPL-CCNC: 20 U/L — SIGNIFICANT CHANGE UP (ref 4–32)
BASOPHILS # BLD AUTO: 0.01 K/UL — SIGNIFICANT CHANGE UP (ref 0–0.2)
BASOPHILS # BLD AUTO: 0.03 K/UL — SIGNIFICANT CHANGE UP (ref 0–0.2)
BASOPHILS # BLD AUTO: 0.05 K/UL — SIGNIFICANT CHANGE UP (ref 0–0.2)
BASOPHILS NFR BLD AUTO: 0.1 % — SIGNIFICANT CHANGE UP (ref 0–2)
BASOPHILS NFR BLD AUTO: 0.2 % — SIGNIFICANT CHANGE UP (ref 0–2)
BASOPHILS NFR BLD AUTO: 0.3 % — SIGNIFICANT CHANGE UP (ref 0–2)
BILIRUB SERPL-MCNC: 0.2 MG/DL — SIGNIFICANT CHANGE UP (ref 0.2–1.2)
BILIRUB SERPL-MCNC: 0.3 MG/DL — SIGNIFICANT CHANGE UP (ref 0.2–1.2)
BUN SERPL-MCNC: 3 MG/DL — LOW (ref 7–23)
BUN SERPL-MCNC: 3 MG/DL — LOW (ref 7–23)
CALCIUM SERPL-MCNC: 7.7 MG/DL — LOW (ref 8.4–10.5)
CALCIUM SERPL-MCNC: 8.3 MG/DL — LOW (ref 8.4–10.5)
CHLORIDE SERPL-SCNC: 104 MMOL/L — SIGNIFICANT CHANGE UP (ref 98–107)
CHLORIDE SERPL-SCNC: 99 MMOL/L — SIGNIFICANT CHANGE UP (ref 98–107)
CO2 SERPL-SCNC: 20 MMOL/L — LOW (ref 22–31)
CO2 SERPL-SCNC: 20 MMOL/L — LOW (ref 22–31)
CREAT SERPL-MCNC: 0.53 MG/DL — SIGNIFICANT CHANGE UP (ref 0.5–1.3)
CREAT SERPL-MCNC: 0.57 MG/DL — SIGNIFICANT CHANGE UP (ref 0.5–1.3)
EGFR: 117 ML/MIN/1.73M2 — SIGNIFICANT CHANGE UP
EGFR: 119 ML/MIN/1.73M2 — SIGNIFICANT CHANGE UP
EOSINOPHIL # BLD AUTO: 0.03 K/UL — SIGNIFICANT CHANGE UP (ref 0–0.5)
EOSINOPHIL # BLD AUTO: 0.03 K/UL — SIGNIFICANT CHANGE UP (ref 0–0.5)
EOSINOPHIL # BLD AUTO: 0.04 K/UL — SIGNIFICANT CHANGE UP (ref 0–0.5)
EOSINOPHIL NFR BLD AUTO: 0.2 % — SIGNIFICANT CHANGE UP (ref 0–6)
EOSINOPHIL NFR BLD AUTO: 0.3 % — SIGNIFICANT CHANGE UP (ref 0–6)
EOSINOPHIL NFR BLD AUTO: 0.3 % — SIGNIFICANT CHANGE UP (ref 0–6)
FIBRINOGEN PPP-MCNC: 722 MG/DL — HIGH (ref 330–520)
FIBRINOGEN PPP-MCNC: 737 MG/DL — HIGH (ref 330–520)
GLUCOSE SERPL-MCNC: 128 MG/DL — HIGH (ref 70–99)
GLUCOSE SERPL-MCNC: 93 MG/DL — SIGNIFICANT CHANGE UP (ref 70–99)
HCT VFR BLD CALC: 20.9 % — CRITICAL LOW (ref 34.5–45)
HCT VFR BLD CALC: 22.5 % — LOW (ref 34.5–45)
HCT VFR BLD CALC: 27.2 % — LOW (ref 34.5–45)
HGB BLD-MCNC: 6.5 G/DL — CRITICAL LOW (ref 11.5–15.5)
HGB BLD-MCNC: 7.1 G/DL — LOW (ref 11.5–15.5)
HGB BLD-MCNC: 8.2 G/DL — LOW (ref 11.5–15.5)
IANC: 12.31 K/UL — HIGH (ref 1.8–7.4)
IANC: 8.16 K/UL — HIGH (ref 1.8–7.4)
IANC: 9.11 K/UL — HIGH (ref 1.8–7.4)
IMM GRANULOCYTES NFR BLD AUTO: 0.3 % — SIGNIFICANT CHANGE UP (ref 0–1.5)
IMM GRANULOCYTES NFR BLD AUTO: 0.5 % — SIGNIFICANT CHANGE UP (ref 0–1.5)
IMM GRANULOCYTES NFR BLD AUTO: 0.7 % — SIGNIFICANT CHANGE UP (ref 0–1.5)
INR BLD: 0.95 RATIO — SIGNIFICANT CHANGE UP (ref 0.88–1.16)
INR BLD: 0.97 RATIO — SIGNIFICANT CHANGE UP (ref 0.88–1.16)
LDH SERPL L TO P-CCNC: 230 U/L — HIGH (ref 135–225)
LDH SERPL L TO P-CCNC: 239 U/L — HIGH (ref 135–225)
LYMPHOCYTES # BLD AUTO: 1.81 K/UL — SIGNIFICANT CHANGE UP (ref 1–3.3)
LYMPHOCYTES # BLD AUTO: 15.3 % — SIGNIFICANT CHANGE UP (ref 13–44)
LYMPHOCYTES # BLD AUTO: 16.4 % — SIGNIFICANT CHANGE UP (ref 13–44)
LYMPHOCYTES # BLD AUTO: 2.67 K/UL — SIGNIFICANT CHANGE UP (ref 1–3.3)
LYMPHOCYTES # BLD AUTO: 25.4 % — SIGNIFICANT CHANGE UP (ref 13–44)
LYMPHOCYTES # BLD AUTO: 3.1 K/UL — SIGNIFICANT CHANGE UP (ref 1–3.3)
MAGNESIUM SERPL-MCNC: 3.2 MG/DL — HIGH (ref 1.6–2.6)
MAGNESIUM SERPL-MCNC: 3.2 MG/DL — HIGH (ref 1.6–2.6)
MAGNESIUM SERPL-MCNC: 4.5 MG/DL — HIGH (ref 1.6–2.6)
MAGNESIUM SERPL-MCNC: 4.6 MG/DL — HIGH (ref 1.6–2.6)
MAGNESIUM SERPL-MCNC: 5.2 MG/DL — HIGH (ref 1.6–2.6)
MCHC RBC-ENTMCNC: 26.4 PG — LOW (ref 27–34)
MCHC RBC-ENTMCNC: 26.5 PG — LOW (ref 27–34)
MCHC RBC-ENTMCNC: 26.9 PG — LOW (ref 27–34)
MCHC RBC-ENTMCNC: 30.1 GM/DL — LOW (ref 32–36)
MCHC RBC-ENTMCNC: 31.1 GM/DL — LOW (ref 32–36)
MCHC RBC-ENTMCNC: 31.6 GM/DL — LOW (ref 32–36)
MCV RBC AUTO: 83.6 FL — SIGNIFICANT CHANGE UP (ref 80–100)
MCV RBC AUTO: 85.3 FL — SIGNIFICANT CHANGE UP (ref 80–100)
MCV RBC AUTO: 89.2 FL — SIGNIFICANT CHANGE UP (ref 80–100)
MONOCYTES # BLD AUTO: 0.77 K/UL — SIGNIFICANT CHANGE UP (ref 0–0.9)
MONOCYTES # BLD AUTO: 0.86 K/UL — SIGNIFICANT CHANGE UP (ref 0–0.9)
MONOCYTES # BLD AUTO: 1.16 K/UL — HIGH (ref 0–0.9)
MONOCYTES NFR BLD AUTO: 6.3 % — SIGNIFICANT CHANGE UP (ref 2–14)
MONOCYTES NFR BLD AUTO: 7.1 % — SIGNIFICANT CHANGE UP (ref 2–14)
MONOCYTES NFR BLD AUTO: 7.3 % — SIGNIFICANT CHANGE UP (ref 2–14)
NEUTROPHILS # BLD AUTO: 12.31 K/UL — HIGH (ref 1.8–7.4)
NEUTROPHILS # BLD AUTO: 8.16 K/UL — HIGH (ref 1.8–7.4)
NEUTROPHILS # BLD AUTO: 9.11 K/UL — HIGH (ref 1.8–7.4)
NEUTROPHILS NFR BLD AUTO: 67.1 % — SIGNIFICANT CHANGE UP (ref 43–77)
NEUTROPHILS NFR BLD AUTO: 75.5 % — SIGNIFICANT CHANGE UP (ref 43–77)
NEUTROPHILS NFR BLD AUTO: 76.7 % — SIGNIFICANT CHANGE UP (ref 43–77)
NRBC # BLD: 0 /100 WBCS — SIGNIFICANT CHANGE UP
NRBC # FLD: 0 K/UL — SIGNIFICANT CHANGE UP
PLATELET # BLD AUTO: 278 K/UL — SIGNIFICANT CHANGE UP (ref 150–400)
PLATELET # BLD AUTO: 293 K/UL — SIGNIFICANT CHANGE UP (ref 150–400)
PLATELET # BLD AUTO: 294 K/UL — SIGNIFICANT CHANGE UP (ref 150–400)
POTASSIUM SERPL-MCNC: 3.7 MMOL/L — SIGNIFICANT CHANGE UP (ref 3.5–5.3)
POTASSIUM SERPL-MCNC: 3.9 MMOL/L — SIGNIFICANT CHANGE UP (ref 3.5–5.3)
POTASSIUM SERPL-SCNC: 3.7 MMOL/L — SIGNIFICANT CHANGE UP (ref 3.5–5.3)
POTASSIUM SERPL-SCNC: 3.9 MMOL/L — SIGNIFICANT CHANGE UP (ref 3.5–5.3)
PROT SERPL-MCNC: 5.4 G/DL — LOW (ref 6–8.3)
PROT SERPL-MCNC: 5.7 G/DL — LOW (ref 6–8.3)
PROTHROM AB SERPL-ACNC: 11 SEC — SIGNIFICANT CHANGE UP (ref 10.5–13.4)
PROTHROM AB SERPL-ACNC: 11.3 SEC — SIGNIFICANT CHANGE UP (ref 10.5–13.4)
RBC # BLD: 2.45 M/UL — LOW (ref 3.8–5.2)
RBC # BLD: 2.69 M/UL — LOW (ref 3.8–5.2)
RBC # BLD: 3.05 M/UL — LOW (ref 3.8–5.2)
RBC # FLD: 16.2 % — HIGH (ref 10.3–14.5)
RBC # FLD: 16.3 % — HIGH (ref 10.3–14.5)
RBC # FLD: 16.7 % — HIGH (ref 10.3–14.5)
RUBV IGG SER-ACNC: 2.8 INDEX — SIGNIFICANT CHANGE UP
RUBV IGG SER-IMP: POSITIVE — SIGNIFICANT CHANGE UP
SODIUM SERPL-SCNC: 129 MMOL/L — LOW (ref 135–145)
SODIUM SERPL-SCNC: 136 MMOL/L — SIGNIFICANT CHANGE UP (ref 135–145)
T PALLIDUM AB TITR SER: NEGATIVE — SIGNIFICANT CHANGE UP
URATE SERPL-MCNC: 3.3 MG/DL — SIGNIFICANT CHANGE UP (ref 2.5–7)
URATE SERPL-MCNC: 3.5 MG/DL — SIGNIFICANT CHANGE UP (ref 2.5–7)
WBC # BLD: 11.86 K/UL — HIGH (ref 3.8–10.5)
WBC # BLD: 12.19 K/UL — HIGH (ref 3.8–10.5)
WBC # BLD: 16.3 K/UL — HIGH (ref 3.8–10.5)
WBC # FLD AUTO: 11.86 K/UL — HIGH (ref 3.8–10.5)
WBC # FLD AUTO: 12.19 K/UL — HIGH (ref 3.8–10.5)
WBC # FLD AUTO: 16.3 K/UL — HIGH (ref 3.8–10.5)

## 2022-06-07 RX ORDER — LABETALOL HCL 100 MG
200 TABLET ORAL
Refills: 0 | Status: DISCONTINUED | OUTPATIENT
Start: 2022-06-07 | End: 2022-06-10

## 2022-06-07 RX ORDER — ACETAMINOPHEN 500 MG
1000 TABLET ORAL ONCE
Refills: 0 | Status: COMPLETED | OUTPATIENT
Start: 2022-06-07 | End: 2022-06-07

## 2022-06-07 RX ORDER — SODIUM CHLORIDE 9 MG/ML
1000 INJECTION, SOLUTION INTRAVENOUS
Refills: 0 | Status: DISCONTINUED | OUTPATIENT
Start: 2022-06-07 | End: 2022-06-07

## 2022-06-07 RX ORDER — MAGNESIUM SULFATE 500 MG/ML
2 VIAL (ML) INJECTION
Qty: 40 | Refills: 0 | Status: DISCONTINUED | OUTPATIENT
Start: 2022-06-07 | End: 2022-06-08

## 2022-06-07 RX ORDER — DIPHENHYDRAMINE HCL 50 MG
25 CAPSULE ORAL EVERY 6 HOURS
Refills: 0 | Status: DISCONTINUED | OUTPATIENT
Start: 2022-06-07 | End: 2022-06-10

## 2022-06-07 RX ORDER — SODIUM CHLORIDE 9 MG/ML
1000 INJECTION, SOLUTION INTRAVENOUS
Refills: 0 | Status: DISCONTINUED | OUTPATIENT
Start: 2022-06-07 | End: 2022-06-08

## 2022-06-07 RX ORDER — ASPIRIN/CALCIUM CARB/MAGNESIUM 324 MG
0 TABLET ORAL
Qty: 0 | Refills: 0 | DISCHARGE

## 2022-06-07 RX ADMIN — Medication 30 MILLIGRAM(S): at 23:44

## 2022-06-07 RX ADMIN — Medication 975 MILLIGRAM(S): at 16:06

## 2022-06-07 RX ADMIN — Medication 30 MILLIGRAM(S): at 06:42

## 2022-06-07 RX ADMIN — Medication 30 MILLIGRAM(S): at 11:46

## 2022-06-07 RX ADMIN — Medication 1000 MILLIGRAM(S): at 03:03

## 2022-06-07 RX ADMIN — Medication 50 GM/HR: at 19:15

## 2022-06-07 RX ADMIN — Medication 30 MILLIGRAM(S): at 18:04

## 2022-06-07 RX ADMIN — HEPARIN SODIUM 5000 UNIT(S): 5000 INJECTION INTRAVENOUS; SUBCUTANEOUS at 06:08

## 2022-06-07 RX ADMIN — Medication 50 GM/HR: at 00:57

## 2022-06-07 RX ADMIN — Medication 975 MILLIGRAM(S): at 08:34

## 2022-06-07 RX ADMIN — Medication 30 MILLIGRAM(S): at 06:09

## 2022-06-07 RX ADMIN — Medication 50 GM/HR: at 05:10

## 2022-06-07 RX ADMIN — Medication 975 MILLIGRAM(S): at 15:12

## 2022-06-07 RX ADMIN — Medication 400 MILLIGRAM(S): at 02:33

## 2022-06-07 RX ADMIN — Medication 200 MILLIGRAM(S): at 20:23

## 2022-06-07 RX ADMIN — Medication 50 GM/HR: at 07:22

## 2022-06-07 RX ADMIN — Medication 200 MILLIGRAM(S): at 08:33

## 2022-06-07 RX ADMIN — Medication 975 MILLIGRAM(S): at 00:00

## 2022-06-07 RX ADMIN — Medication 30 MILLIGRAM(S): at 12:33

## 2022-06-07 RX ADMIN — Medication 30 MILLIGRAM(S): at 17:29

## 2022-06-07 RX ADMIN — HEPARIN SODIUM 5000 UNIT(S): 5000 INJECTION INTRAVENOUS; SUBCUTANEOUS at 17:29

## 2022-06-07 NOTE — DISCHARGE NOTE OB - PATIENT PORTAL LINK FT
You can access the FollowMyHealth Patient Portal offered by [facility] by registering at the following website: [email or web address]. By joining [facility]’s FollowMyHealth portal, you will also be able to view your health information using other applications (apps) compatible with our system.

## 2022-06-07 NOTE — OB PROVIDER DELIVERY SUMMARY - NSSELHIDDEN_OBGYN_ALL_OB_FT
[NS_[email or web address]_ALL_OB_FT:[id number]],[NS_DeliveryAssist1_OBGYN_ALL_OB_FT:[id number]],[NS_DeliveryRN_OBGYN_ALL_OB_FT:[id number]=]

## 2022-06-07 NOTE — DISCHARGE NOTE OB - CARE PLAN
1 Principal Discharge DX:	 delivery delivered  Assessment and plan of treatment:	recovery   Principal Discharge DX:	 delivery delivered  Assessment and plan of treatment:	After discharge, please stay on pelvic rest for 6 weeks, meaning no sexual intercourse, no tampons and no douching.  No driving for 2 weeks as women can loose a lot of blood during delivery and there is a possibility of being lightheaded/fainting.  No lifting objects heavier than baby for two weeks.  Expect to have vaginal bleeding/spotting for up to six weeks.  The bleeding should get lighter and more white/light brown with time.  For bleeding soaking more than a pad an hour or passing clots greater than the size of your fist, come in to the emergency department.    Follow up in clinic in 2 weeks for incision check.  Call clinic for noticeable increase in redness or swelling at incision, discharge from incision, or opening of skin at incision site.  Secondary Diagnosis:	Chronic hypertension with superimposed pre-eclampsia  Assessment and plan of treatment:	Follow up in office in 2-3 days for a blood pressure check. If you have elevated blood pressures >160/110 or severe headache, blurry vision, increased swelling, or pain in your upper belly on the right side please come to Emergency Department.

## 2022-06-07 NOTE — PROVIDER CONTACT NOTE (CRITICAL VALUE NOTIFICATION) - ACTION/TREATMENT ORDERED:
As per Md ,Md will call back after spoke to attending. As per Md ,Md will call back after speak to attending.

## 2022-06-07 NOTE — CHART NOTE - NSCHARTNOTEFT_GEN_A_CORE
Patient agreed to accept 2upRBC. Will f/u with AM CBC as post transfusion CBC    discussed w Dr Vanessa Young PGY-1
S: Patient is POD#1 s/p pLTCS+myomectomy (QBL: 1366). Patient evaluated 2/2 to low Hct noted on 3pm CBC. (Hct trend: 29->27.6->27.2->22.5->20.9). On exam, patient endorsed that when she gets out of bed she feel dizziness and lightheaded. Denies palpitations, headaches.     O: Vital Signs Last 24 Hrs  T(C): 36.9 (07 Jun 2022 16:00), Max: 37.1 (06 Jun 2022 18:45)  T(F): 98.4 (07 Jun 2022 16:00), Max: 98.8 (07 Jun 2022 00:45)  HR: 96 (07 Jun 2022 16:00) (71 - 100)  BP: 117/61 (07 Jun 2022 16:00) (94/63 - 182/79)  BP(mean): 78 (07 Jun 2022 04:00) (67 - 96)  RR: 18 (07 Jun 2022 16:00) (14 - 24)  SpO2: 100% (07 Jun 2022 16:00) (89% - 100%)  Gen: NAD  CV: RRR  Abd: Soft, mildly distended, appropriately tender to palpation    A/P: Patient evaluated 2/2 to low Hct (H/H 6.5/20.9). On exam, patient noted to be symptomatic as she endorsed dizziness when she walked.    -given the fact that patient's Hct dropepd from 29->20.9 as well as the fact that the patient was symptomatic, patient was offered and encouraged to receive 2upRBC. Patient declined blood transfusion at the time of examination; however, states that she will think about it  -VSS, continue to monitor  -AM CBC (6/8)    discussed with Dr Donnie Young, PGY-1
ob attending    pt moderately uncomfortable  fht 140 moderate variability no accels intermittent late decels  toco q 4-5 min  a/p cat 2 fht  resusitation position  O2  to remove cx balloon  consider arom if cx change  discussed with patient may consider  section if cx with minimal cx change as remote from delivery with small baby and unable to augment    Becky CUELLAR
Intrapartum Mag Check    41y y.o.  @35+6 wga who just met criteria for siPEC with severe features with back to back /70s.  Seen at bedside and denies HA, blurry vision, RUQ pain.    O:   Magnesium loading dose 4g   Magnesium maintenance dose 2g    Vitals:  VS  T(C): 36.9 (22 @ 14:33)  HR: 80 (22 @ 17:52)  BP: 164/77 (22 @ 17:52)  RR: 15 (22 @ 14:33)  SpO2: --    PE:  Gen: NAD  Resp: CTA b/l  CV: RR, normal s1, s2  Abd: gravid, NT, no RUQ tenderness  Ext: DTRs: 2+ non pitting edema    Labs:                        8.7    8.19  )-----------( 316      ( 2022 13:10 )             27.6         137    |  104    |  4<L>   ----------------------------<  69<L>  3.9     |  20<L>  |  0.57     Ca    9.3        2022 13:10    TPro  6.6    /  Alb  3.4    /  TBili  <0.2   /  DBili  x      /  AST  16     /  ALT  15     /  AlkPhos  129<H>          PT/INR - ( 2022 13:10 )   PT: 11.4 sec;   INR: 0.98 ratio      PTT - ( 2022 13:10 )  PTT:25.3 sec        Urine P/C: 0.4  24hr protein 454    I/O:  I&O's Detail    2022 07:01  -  2022 17:57  --------------------------------------------------------  IN:    dextrose 5% + lactated ringers: 375 mL    IV PiggyBack: 100 mL  Total IN: 475 mL    OUT:  Total OUT: 0 mL    Total NET: 475 mL          A/P: 41y y.o.  at 35+6wga newly declared siPEC w/ severe features. Will start magnesium sulfate and give 20 labetalol IVP. See labor progress notes for labor course.     - 20 IV labetalol for SBP>160   - start magnesium loading dose  - Reassess BP in 10 minutes after 20 labetalol  - UOP to be monitored closely.   - PEC labs noted and wnl, P/C 0.4    NAY Joe, PGY1
ob attending    fht 140 moderate variability with continue late decels     a/p cat 2 fht remote from delivery in IUGR   discussed with patient recommendation for  section due to above  no improvement sp cx balloon removal and no advancement of cervix  I discussed with patient risks include but not limited to infection, bleeding, injury to other organs-- patient signed consent  Awaiting  to return to go to OR will roll back and start spinal    Becky CUELLAR

## 2022-06-07 NOTE — OB PROVIDER DELIVERY SUMMARY - NSPROVIDERDELIVERYNOTE_OBGYN_ALL_OB_FT
Ob attending    I was present and participated in LFT with J incision myomectomy liveborn male infant.    Becky CUELLAR Category two tracing remote from delivery, during induction of labor for IUGR and super imposed preeclampsia. Viable male infant Apgars 7/9 weighing 2360g via low transverse incision extended to a J incision in the mid uterus. 3cm Myomectomy performed in the hysterotomy to aid with closure. Bladder backfilled with methylene blue. Surgicel powder was used. Patient should not labor in the future.     Ob attending    I was present and participated in LFT with J incision myomectomy liveborn male infant.    Becky CUELLAR

## 2022-06-07 NOTE — OB POSTPARTUM EVENT NOTE - NS_EVENTSUMMARY1_OBGYN_ALL_OB_FT
Pt s/p urgent nonscheduled primary c/s.   QBL: 1366  Pt stated she is not lightheaded or dizzy  Magnesium sulfate restarted at 0100 at 50 cc/hr   Pt is 94kg

## 2022-06-07 NOTE — DISCHARGE NOTE OB - MATERIALS PROVIDED
[place]accinations/[place]  Screening Program/  Immunization Record/Breastfeeding Log/Guide to Postpartum Care/Shaken Baby Prevention Handout

## 2022-06-07 NOTE — OB PROVIDER DELIVERY SUMMARY - AS DELIV COMPLICATIONS OB
NUC x1/abnormal fetal heart rate tracing/nuchal cord/pre eclampsia abnormal fetal heart rate tracing/nuchal cord/pre eclampsia

## 2022-06-07 NOTE — DISCHARGE NOTE OB - MEDICATION SUMMARY - MEDICATIONS TO STOP TAKING
I will STOP taking the medications listed below when I get home from the hospital:    aspirin 81 mg oral tablet  -- orally once a day   I will STOP taking the medications listed below when I get home from the hospital:  None

## 2022-06-07 NOTE — DISCHARGE NOTE OB - HOSPITAL COURSE
section  myomectomy  liveborn infant  section on  pLTCS @35wga for Cat 2 tracing ,liveborn infant  QBL 1366  Hct: 27.6->>20.2u PRBC->25.9  Patient is siPEC and received Mg during hospitalization.  Patient discharged on POD#4 in stable condition with normal vital signs. She was ambulating, tolerating po, voiding spontaneously, and pain was controlled. Patient will continue on Lab 200 BID

## 2022-06-07 NOTE — DISCHARGE NOTE OB - MEDICATION SUMMARY - MEDICATIONS TO TAKE
I will START or STAY ON the medications listed below when I get home from the hospital:    labetalol 200 mg oral tablet  -- 1 tab(s) by mouth 2 times a day  -- Indication: For bp    Prenatal 1 oral capsule  -- orally once a day  -- Indication: For vitamin   I will START or STAY ON the medications listed below when I get home from the hospital:    ibuprofen 600 mg oral tablet  -- 1 tab(s) by mouth every 6 hours  -- Indication: For  delivery delivered    labetalol 200 mg oral tablet  -- 1 tab(s) by mouth 2 times a day  -- Indication: For bp    Prenatal 1 oral capsule  -- orally once a day  -- Indication: For vitamin

## 2022-06-07 NOTE — DISCHARGE NOTE OB - NS MD DC FALL RISK RISK
For information on Fall & Injury Prevention, visit: [email or web address] OR  [email or web address] OR  https://www.cdc.gov/steadi/patient.html

## 2022-06-07 NOTE — OB POSTPARTUM EVENT NOTE - NS_EVENTFINDINGS1_OBGYN_ALL_OB_FT
Con't to monitor urine output and patient status.  Urine output to be at 35cc/hr or greater as per Dr. Julio.

## 2022-06-07 NOTE — DISCHARGE NOTE OB - PLAN OF CARE
recovery After discharge, please stay on pelvic rest for 6 weeks, meaning no sexual intercourse, no tampons and no douching.  No driving for 2 weeks as women can loose a lot of blood during delivery and there is a possibility of being lightheaded/fainting.  No lifting objects heavier than baby for two weeks.  Expect to have vaginal bleeding/spotting for up to six weeks.  The bleeding should get lighter and more white/light brown with time.  For bleeding soaking more than a pad an hour or passing clots greater than the size of your fist, come in to the emergency department.    Follow up in clinic in 2 weeks for incision check.  Call clinic for noticeable increase in redness or swelling at incision, discharge from incision, or opening of skin at incision site. Follow up in office in 2-3 days for a blood pressure check. If you have elevated blood pressures >160/110 or severe headache, blurry vision, increased swelling, or pain in your upper belly on the right side please come to Emergency Department.

## 2022-06-07 NOTE — DISCHARGE NOTE OB - CARE PROVIDER_API CALL
Sugar Bates)  Obstetrics and Gynecology  [address] [place] [address]  Phone: [phone]  Fax: [phone]  Follow Up Time:

## 2022-06-08 LAB
BASOPHILS # BLD AUTO: 0.02 K/UL — SIGNIFICANT CHANGE UP (ref 0–0.2)
BASOPHILS NFR BLD AUTO: 0.1 % — SIGNIFICANT CHANGE UP (ref 0–2)
EOSINOPHIL # BLD AUTO: 0.03 K/UL — SIGNIFICANT CHANGE UP (ref 0–0.5)
EOSINOPHIL NFR BLD AUTO: 0.2 % — SIGNIFICANT CHANGE UP (ref 0–6)
HCT VFR BLD CALC: 25.9 % — LOW (ref 34.5–45)
HGB BLD-MCNC: 8.6 G/DL — LOW (ref 11.5–15.5)
IANC: 9.97 K/UL — HIGH (ref 1.8–7.4)
IMM GRANULOCYTES NFR BLD AUTO: 0.6 % — SIGNIFICANT CHANGE UP (ref 0–1.5)
LYMPHOCYTES # BLD AUTO: 2.98 K/UL — SIGNIFICANT CHANGE UP (ref 1–3.3)
LYMPHOCYTES # BLD AUTO: 21.1 % — SIGNIFICANT CHANGE UP (ref 13–44)
MCHC RBC-ENTMCNC: 27.7 PG — SIGNIFICANT CHANGE UP (ref 27–34)
MCHC RBC-ENTMCNC: 33.2 GM/DL — SIGNIFICANT CHANGE UP (ref 32–36)
MCV RBC AUTO: 83.3 FL — SIGNIFICANT CHANGE UP (ref 80–100)
MONOCYTES # BLD AUTO: 1.05 K/UL — HIGH (ref 0–0.9)
MONOCYTES NFR BLD AUTO: 7.4 % — SIGNIFICANT CHANGE UP (ref 2–14)
NEUTROPHILS # BLD AUTO: 9.97 K/UL — HIGH (ref 1.8–7.4)
NEUTROPHILS NFR BLD AUTO: 70.6 % — SIGNIFICANT CHANGE UP (ref 43–77)
NRBC # BLD: 0 /100 WBCS — SIGNIFICANT CHANGE UP
NRBC # FLD: 0 K/UL — SIGNIFICANT CHANGE UP
PLATELET # BLD AUTO: 291 K/UL — SIGNIFICANT CHANGE UP (ref 150–400)
RBC # BLD: 3.11 M/UL — LOW (ref 3.8–5.2)
RBC # FLD: 16 % — HIGH (ref 10.3–14.5)
WBC # BLD: 14.13 K/UL — HIGH (ref 3.8–10.5)
WBC # FLD AUTO: 14.13 K/UL — HIGH (ref 3.8–10.5)

## 2022-06-08 RX ORDER — IBUPROFEN 200 MG
600 TABLET ORAL EVERY 6 HOURS
Refills: 0 | Status: DISCONTINUED | OUTPATIENT
Start: 2022-06-08 | End: 2022-06-10

## 2022-06-08 RX ADMIN — Medication 975 MILLIGRAM(S): at 21:44

## 2022-06-08 RX ADMIN — OXYCODONE HYDROCHLORIDE 5 MILLIGRAM(S): 5 TABLET ORAL at 04:25

## 2022-06-08 RX ADMIN — Medication 600 MILLIGRAM(S): at 06:35

## 2022-06-08 RX ADMIN — MAGNESIUM HYDROXIDE 30 MILLILITER(S): 400 TABLET, CHEWABLE ORAL at 08:47

## 2022-06-08 RX ADMIN — OXYCODONE HYDROCHLORIDE 5 MILLIGRAM(S): 5 TABLET ORAL at 09:12

## 2022-06-08 RX ADMIN — Medication 200 MILLIGRAM(S): at 17:43

## 2022-06-08 RX ADMIN — Medication 975 MILLIGRAM(S): at 08:38

## 2022-06-08 RX ADMIN — Medication 975 MILLIGRAM(S): at 20:44

## 2022-06-08 RX ADMIN — OXYCODONE HYDROCHLORIDE 5 MILLIGRAM(S): 5 TABLET ORAL at 03:26

## 2022-06-08 RX ADMIN — HEPARIN SODIUM 5000 UNIT(S): 5000 INJECTION INTRAVENOUS; SUBCUTANEOUS at 05:38

## 2022-06-08 RX ADMIN — OXYCODONE HYDROCHLORIDE 5 MILLIGRAM(S): 5 TABLET ORAL at 08:42

## 2022-06-08 RX ADMIN — Medication 975 MILLIGRAM(S): at 09:08

## 2022-06-08 RX ADMIN — Medication 975 MILLIGRAM(S): at 16:38

## 2022-06-08 RX ADMIN — SIMETHICONE 80 MILLIGRAM(S): 80 TABLET, CHEWABLE ORAL at 16:06

## 2022-06-08 RX ADMIN — Medication 975 MILLIGRAM(S): at 16:08

## 2022-06-08 RX ADMIN — HEPARIN SODIUM 5000 UNIT(S): 5000 INJECTION INTRAVENOUS; SUBCUTANEOUS at 17:42

## 2022-06-08 RX ADMIN — Medication 30 MILLIGRAM(S): at 00:00

## 2022-06-08 RX ADMIN — Medication 600 MILLIGRAM(S): at 05:37

## 2022-06-08 RX ADMIN — Medication 600 MILLIGRAM(S): at 12:25

## 2022-06-08 RX ADMIN — Medication 600 MILLIGRAM(S): at 12:55

## 2022-06-08 NOTE — PROGRESS NOTE ADULT - ATTENDING COMMENTS
agree with plan above.  OOb ambulating, pos voids, pos flatus.   dc home in am - follow up with Dr. Bates in 1-2 weeks in office

## 2022-06-08 NOTE — LACTATION INITIAL EVALUATION - LACTATION INTERVENTIONS
discussed  late    . instructed  to  triple  feed .  mother  pumped  and  colostrum 1 ml  given via  syringe/initiate/review hand expression/initiate/review pumping guidelines and safe milk handling/initiate/review techniques for position and latch/initiate/review breast massage/compression/reviewed components of an effective feeding and at least 8 effective feedings per day required/reviewed importance of monitoring infant diapers, the breastfeeding log, and minimum output each day/reviewed risks of unnecessary formula supplementation/reviewed strategies to transition to breastfeeding only/reviewed benefits and recommendations for rooming in/reviewed feeding on demand/by cue at least 8 times a day

## 2022-06-09 ENCOUNTER — APPOINTMENT (OUTPATIENT)
Dept: ANTEPARTUM | Facility: CLINIC | Age: 42
End: 2022-06-09

## 2022-06-09 ENCOUNTER — APPOINTMENT (OUTPATIENT)
Dept: MATERNAL FETAL MEDICINE | Facility: CLINIC | Age: 42
End: 2022-06-09

## 2022-06-09 RX ORDER — IBUPROFEN 200 MG
1 TABLET ORAL
Qty: 0 | Refills: 0 | DISCHARGE
Start: 2022-06-09

## 2022-06-09 RX ADMIN — Medication 600 MILLIGRAM(S): at 18:33

## 2022-06-09 RX ADMIN — Medication 600 MILLIGRAM(S): at 01:42

## 2022-06-09 RX ADMIN — Medication 600 MILLIGRAM(S): at 05:39

## 2022-06-09 RX ADMIN — Medication 600 MILLIGRAM(S): at 23:29

## 2022-06-09 RX ADMIN — HEPARIN SODIUM 5000 UNIT(S): 5000 INJECTION INTRAVENOUS; SUBCUTANEOUS at 05:46

## 2022-06-09 RX ADMIN — HEPARIN SODIUM 5000 UNIT(S): 5000 INJECTION INTRAVENOUS; SUBCUTANEOUS at 18:03

## 2022-06-09 RX ADMIN — SIMETHICONE 80 MILLIGRAM(S): 80 TABLET, CHEWABLE ORAL at 15:26

## 2022-06-09 RX ADMIN — Medication 975 MILLIGRAM(S): at 03:28

## 2022-06-09 RX ADMIN — Medication 600 MILLIGRAM(S): at 06:39

## 2022-06-09 RX ADMIN — Medication 600 MILLIGRAM(S): at 18:03

## 2022-06-09 RX ADMIN — Medication 975 MILLIGRAM(S): at 21:09

## 2022-06-09 RX ADMIN — Medication 600 MILLIGRAM(S): at 12:01

## 2022-06-09 RX ADMIN — Medication 200 MILLIGRAM(S): at 05:43

## 2022-06-09 RX ADMIN — Medication 600 MILLIGRAM(S): at 00:42

## 2022-06-09 RX ADMIN — Medication 200 MILLIGRAM(S): at 18:09

## 2022-06-09 RX ADMIN — Medication 600 MILLIGRAM(S): at 12:31

## 2022-06-09 RX ADMIN — Medication 975 MILLIGRAM(S): at 22:00

## 2022-06-09 RX ADMIN — Medication 975 MILLIGRAM(S): at 04:28

## 2022-06-09 RX ADMIN — Medication 975 MILLIGRAM(S): at 15:30

## 2022-06-09 RX ADMIN — Medication 975 MILLIGRAM(S): at 16:00

## 2022-06-10 VITALS
SYSTOLIC BLOOD PRESSURE: 143 MMHG | DIASTOLIC BLOOD PRESSURE: 84 MMHG | HEART RATE: 91 BPM | OXYGEN SATURATION: 100 % | TEMPERATURE: 98 F

## 2022-06-10 RX ADMIN — Medication 975 MILLIGRAM(S): at 02:33

## 2022-06-10 RX ADMIN — Medication 975 MILLIGRAM(S): at 09:05

## 2022-06-10 RX ADMIN — Medication 600 MILLIGRAM(S): at 05:40

## 2022-06-10 RX ADMIN — Medication 975 MILLIGRAM(S): at 03:15

## 2022-06-10 RX ADMIN — HEPARIN SODIUM 5000 UNIT(S): 5000 INJECTION INTRAVENOUS; SUBCUTANEOUS at 05:17

## 2022-06-10 RX ADMIN — Medication 600 MILLIGRAM(S): at 00:06

## 2022-06-10 RX ADMIN — Medication 975 MILLIGRAM(S): at 09:35

## 2022-06-10 RX ADMIN — Medication 600 MILLIGRAM(S): at 05:17

## 2022-06-10 RX ADMIN — Medication 200 MILLIGRAM(S): at 05:17

## 2022-06-10 NOTE — PROGRESS NOTE ADULT - ASSESSMENT
A/P: 40yo POD#4 s/p pLTCS 2/2 cat 2 tracing.  Patient is stable and is doing well post-operatively.    actue blood loss  -Hct: 27.6->27.2->22.5->20.9->2upRBC->25.9  -asymptomatic this AM  -VSS, continue to monitor    siPEC  -s/p Mg  -c/w Lab 200 BID  -s/p Lab 20 IVP  -BP well controlled overnight, continue to monitor    post partum  - Continue motrin, tylenol, oxycodone PRN for pain control.  - Increase ambulation  - Continue regular diet  - Discharge planning    Liliane Young MD PGY1
A/P: 42yo  at 35w6d with cHTN on labetalol, GDMA1, IUGR and found to have anhydramnios on bedside scan and category II tracing. Given no fluid and category II tracing along with all additional comorbidities including IUGR, recommend induction of labor at this time. Patient counseled on our recommendations and she understands and agrees with plan. Given GDMA1 we do not recommend late term steroids.   - Recommend IOL at this time   - Continue EFM   - Recommendations discussed with primary Ob     Carly Hirschberg, MFM Fellow   Patient seen and examined with JOSE J Em Attending 
A/P: 40yo POD#1 s/p pLTCS 2/2 cat 2 tracing.  Patient is stable and doing well post-operatively.      siPEC  -continue Mg  -continue Lab 200 BID  -BP well controlled overnight, continue to monitor  -YUKI wnl P/C 0.4    post partum  - Continue regular diet.  - Increase ambulation.  - Continue motrin, tylenol, oxycodone PRN for pain control.    - F/u AM YUKI Young MD PGY1
A/P: 40yo POD#2 s/p pLTCS 2/2 cat 2 tracing.  Patient is stable and doing well post-operatively.      siPEC  -s/p Mg  -continue Lab 200 BID, BP well controlled  -s/p Lab 20    Elevated QBL: 1366  -Hct: 27.6->27.2->22.5->20.9  -s/p 2u pRBC  -f/u AM CBC    post partum  - Continue regular diet.  - Increase ambulation.  - Continue motrin, tylenol, oxycodone PRN for pain control.    Liliane Young MD PGY1
A/P: 40yo POD#3 s/p pLTCS 2/2 cat 2 tracing.  Patient is stable and is doing well post-operatively.    actue blood loss  -Hct: 27.6->27.2->22.5->20.9->2upRBC->25.9  -asymptomatic this AM  -VSS, continue to monitor    siPEC  -s/p Mg  -c/w Lab 200 BID  -s/p Lab 20 IVP  -BP well controlled overnight, continue to monitor    post partum  - Continue motrin, tylenol, oxycodone PRN for pain control.  - Increase ambulation  - Continue regular diet  - Discharge planning    Liliane Young MD PGY1

## 2022-06-10 NOTE — PROGRESS NOTE ADULT - SUBJECTIVE AND OBJECTIVE BOX
Postop Day  __1_ s/p   C- Section    THERAPY:  [ x ] Spinal morphine   [  ] Epidural morphine   [  ] IV PCA Hydromorphone 1 mg/ml    acetaminophen     Tablet .. 975 milliGRAM(s) Oral <User Schedule>  dexAMETHasone  Injectable 4 milliGRAM(s) IV Push every 6 hours PRN  diphenhydrAMINE 25 milliGRAM(s) Oral every 6 hours PRN  diphtheria/tetanus/pertussis (acellular) Vaccine (ADAcel) 0.5 milliLiter(s) IntraMuscular once  heparin   Injectable 5000 Unit(s) SubCutaneous every 12 hours  ibuprofen  Tablet. 600 milliGRAM(s) Oral every 6 hours  ketorolac   Injectable 30 milliGRAM(s) IV Push every 6 hours  labetalol 200 milliGRAM(s) Oral two times a day  lactated ringers. 1000 milliLiter(s) IV Continuous <Continuous>  lanolin Ointment 1 Application(s) Topical every 6 hours PRN  magnesium hydroxide Suspension 30 milliLiter(s) Oral two times a day PRN  magnesium sulfate Infusion 2 Gm/Hr IV Continuous <Continuous>  nalbuphine Injectable 2.5 milliGRAM(s) IV Push every 6 hours PRN  naloxone Injectable 0.1 milliGRAM(s) IV Push every 3 minutes PRN  ondansetron Injectable 4 milliGRAM(s) IV Push every 6 hours PRN  oxyCODONE    IR 5 milliGRAM(s) Oral every 3 hours PRN  oxyCODONE    IR 10 milliGRAM(s) Oral every 3 hours PRN  oxyCODONE    IR 5 milliGRAM(s) Oral every 3 hours PRN  oxyCODONE    IR 5 milliGRAM(s) Oral once PRN  oxytocin Infusion 333.333 milliUNIT(s)/Min IV Continuous <Continuous>  oxytocin Infusion 16.667 milliUNIT(s)/Min IV Continuous <Continuous>  simethicone 80 milliGRAM(s) Chew every 4 hours PRN      T(C): 36.9 (06-07-22 @ 16:00), Max: 37.1 (06-06-22 @ 18:45)  HR: 96 (06-07-22 @ 16:00) (71 - 100)  BP: 117/61 (06-07-22 @ 16:00) (94/63 - 164/77)  RR: 18 (06-07-22 @ 16:00) (14 - 24)  SpO2: 100% (06-07-22 @ 16:00) (89% - 100%)    Pain:   ______mild_____ at rest;  _____moderate______with activity    Sedation Score:	  [ x ] Alert	    [  ] Drowsy        [  ] Arousable	[  ] Asleep	[  ] Unresponsive    Side Effects:	  [x  ] None	     [  ] Nausea        [  ] Pruritus        [  ] Weakness   [  ] Numbness        ASSESSMENT/ PLAN  [   ] Side effects resolving      [   ] Patient made aware of PRN meds available     [ x] Discontinue & switch to PRN pain medications        [  ] Continue       Patient states lower extremities feel and move normally. No apparent anesthetic complications.
Postpartum Note,  Section  She is a  41y woman who is now post-operative day:1     Subjective:  The patient feels well on magnesium.  She is not ambulating yet.   She is tolerating regular diet.  She denies nausea and vomiting.  She is voiding.  Her pain is controlled.  She reports normal postpartum bleeding.        Physical exam:    Vital Signs Last 24 Hrs  T(C): 37.1 (2022 00:45), Max: 37.1 (2022 12:14)  T(F): 98.8 (2022 00:45), Max: 98.8 (2022 12:14)  HR: 77 (2022 04:00) (71 - 100)  BP: 123/65 (2022 04:00) (94/63 - 182/79)  BP(mean): 78 (2022 04:00) (67 - 96)  RR: 14 (2022 04:00) (14 - 24)  SpO2: 100% (2022 04:00) (89% - 100%)    Gen: NAD  Breast: Soft, nontender, not engorged.  Abdomen: Soft, nontender, no distension , firm uterine fundus at umbilicus.  Incision: Clean, dry, and intact  dressing  Pelvic: Normal lochia noted  Ext: No calf tenderness    LABS:                        8.2    16.30 )-----------( 294      ( 2022 01:30 )             27.2                         8.7    8.19  )-----------( 316      ( 2022 13:10 )             27.6     Magnesium, Serum: 3.20 mg/dL ( @ 02:00)  Magnesium, Serum: 3.20 mg/dL ( @ 01:30)  ABO Interpretation: O ( @ 14:33)  Rh Interpretation: Positive ( @ 14:33)  ABO Interpretation: O ( 14:32)  Rh Interpretation: Positive ( @ 14:32)  Antibody Screen: Negative ( @ 14:32)    22 @ 01:30      136  |  104  |  3<L>  ----------------------------<  93  3.9   |  20<L>  |  0.57    22 @ 13:10      137  |  104  |  4<L>  ----------------------------<  69<L>  3.9   |  20<L>  |  0.57        Ca    8.3<L>      2022 01:30  Ca    9.3      2022 13:10  Mg     3.20<H>       Mg     3.20<H>         TPro  5.7<L>  /  Alb  2.8<L>  /  TBili  0.2  /  DBili  x   /  AST  19  /  ALT  7   /  AlkPhos  115  22 @ 01:30  TPro  6.6  /  Alb  3.4  /  TBili  <0.2  /  DBili  x   /  AST  16  /  ALT  15  /  AlkPhos  129<H>  22 @ 13:10        Allergies    No Known Allergies    Intolerances      MEDICATIONS  (STANDING):  acetaminophen     Tablet .. 975 milliGRAM(s) Oral <User Schedule>  diphtheria/tetanus/pertussis (acellular) Vaccine (ADAcel) 0.5 milliLiter(s) IntraMuscular once  heparin   Injectable 5000 Unit(s) SubCutaneous every 12 hours  ketorolac   Injectable 30 milliGRAM(s) IV Push every 6 hours  lactated ringers. 1000 milliLiter(s) (50 mL/Hr) IV Continuous <Continuous>  magnesium sulfate Infusion 2 Gm/Hr (50 mL/Hr) IV Continuous <Continuous>  oxytocin Infusion 333.333 milliUNIT(s)/Min (1000 mL/Hr) IV Continuous <Continuous>  oxytocin Infusion 16.667 milliUNIT(s)/Min (50 mL/Hr) IV Continuous <Continuous>    MEDICATIONS  (PRN):  diphenhydrAMINE 25 milliGRAM(s) Oral every 6 hours PRN Pruritus  oxyCODONE    IR 5 milliGRAM(s) Oral once PRN Moderate to Severe Pain (4-10)  simethicone 80 milliGRAM(s) Chew every 4 hours PRN Gas        Assessment and Plan  POD #1 s/p  section sPEC on magnesium  Doing well.  Encourage ambulation.  discussed with patient due to type of scar only  sections    Becky CUELLAR        
ANESTHESIA POSTOP CHECK    41y Female POSTOP DAY 1     Vital Signs Last 24 Hrs  T(C): 36.9 (07 Jun 2022 16:00), Max: 37.1 (06 Jun 2022 18:45)  T(F): 98.4 (07 Jun 2022 16:00), Max: 98.8 (07 Jun 2022 00:45)  HR: 96 (07 Jun 2022 16:00) (71 - 100)  BP: 117/61 (07 Jun 2022 16:00) (94/63 - 164/77)  BP(mean): 78 (07 Jun 2022 04:00) (67 - 96)  RR: 18 (07 Jun 2022 16:00) (14 - 24)  SpO2: 100% (07 Jun 2022 16:00) (89% - 100%)  I&O's Summary    06 Jun 2022 07:01  -  07 Jun 2022 07:00  --------------------------------------------------------  IN: 3950 mL / OUT: 1931 mL / NET: 2019 mL    07 Jun 2022 07:01  -  07 Jun 2022 17:50  --------------------------------------------------------  IN: 1350 mL / OUT: 1575 mL / NET: -225 mL        [X ] NO APPARENT ANESTHESIA COMPLICATIONS      Comments: 
OB Postpartum Note:  Delivery, POD#4    S: 40yo POD#4 s/p pLTCS + myomectomy with J incision 2/2 cat 2 tracing. Delivery c/b siPEC/Mg The patient feels well. Post partum course c/b acute blood loss anemia for which she received 2upRBC  Pain is well controlled. She is tolerating a regular diet and passing flatus. She is voiding spontaneously, and ambulating without difficulty. Denies CP/SOB. Denies lightheadedness/dizziness. Denies N/V. Denies HA, changes in vision, RUQ pain, epigastric pain    O:  Vitals:  Vital Signs Last 24 Hrs  T(C): 36.9 (10 Kian 2022 05:15), Max: 37 (2022 13:52)  T(F): 98.5 (10 Kian 2022 05:15), Max: 98.6 (2022 13:52)  HR: 90 (10 Kian 2022 05:15) (90 - 106)  BP: 138/80 (10 Kian 2022 05:15) (124/71 - 144/81)  BP(mean): --  RR: 16 (10 Kian 2022 05:15) (16 - 18)  SpO2: 100% (10 Kian 2022 05:15) (100% - 100%)    MEDICATIONS  (STANDING):  acetaminophen     Tablet .. 975 milliGRAM(s) Oral <User Schedule>  diphtheria/tetanus/pertussis (acellular) Vaccine (ADAcel) 0.5 milliLiter(s) IntraMuscular once  heparin   Injectable 5000 Unit(s) SubCutaneous every 12 hours  ibuprofen  Tablet. 600 milliGRAM(s) Oral every 6 hours  labetalol 200 milliGRAM(s) Oral two times a day    MEDICATIONS  (PRN):  dexAMETHasone  Injectable 4 milliGRAM(s) IV Push every 6 hours PRN Nausea  diphenhydrAMINE 25 milliGRAM(s) Oral every 6 hours PRN Pruritus  diphenhydrAMINE 25 milliGRAM(s) Oral every 6 hours PRN Rash and/or Itching  lanolin Ointment 1 Application(s) Topical every 6 hours PRN Sore Nipples  magnesium hydroxide Suspension 30 milliLiter(s) Oral two times a day PRN Constipation  nalbuphine Injectable 2.5 milliGRAM(s) IV Push every 6 hours PRN Pruritus  naloxone Injectable 0.1 milliGRAM(s) IV Push every 3 minutes PRN For ANY of the following changes in patient status:  A. Breaths Per Minute LESS THAN 10, B. Oxygen saturation LESS THAN 90%, C. Sedation score of 6 for Stop After: 4 Times  ondansetron Injectable 4 milliGRAM(s) IV Push every 6 hours PRN Nausea  oxyCODONE    IR 5 milliGRAM(s) Oral every 3 hours PRN Moderate to Severe Pain (4-10)  oxyCODONE    IR 5 milliGRAM(s) Oral once PRN Moderate to Severe Pain (4-10)  simethicone 80 milliGRAM(s) Chew every 4 hours PRN Gas      LABS:  Blood type: O Positive  Rubella IgG: RPR: Negative                          8.6<L>   14.13<H> >-----------< 291    (  @ 09:10 )             25.9<L>                        6.5<LL>   11.86<H> >-----------< 293    (  14:45 )             20.9<LL>                        7.1<L>   12.19<H> >-----------< 278    (  08:45 )             22.5<L>                        8.2<L>   16.30<H> >-----------< 294    (  01:30 )             27.2<L>                        8.7<L>   8.19 >-----------< 316    (  13:10 )             27.6<L>    22 @ 08:45      129<L>  |  99  |  3<L>  ----------------------------<  128<H>  3.7   |  20<L>  |  0.53    22 @ 01:30      136  |  104  |  3<L>  ----------------------------<  93  3.9   |  20<L>  |  0.57    22 @ 13:10      137  |  104  |  4<L>  ----------------------------<  69<L>  3.9   |  20<L>  |  0.57        Ca    7.7<L>      2022 08:45  Ca    8.3<L>      2022 01:30  Ca    9.3      2022 13:10  Mg     5.20<H>     -  Mg     4.60<H>     -  Mg     4.50<H>     -  Mg     3.20<H>     06-07  Mg     3.20<H>     -    TPro  5.4<L>  /  Alb  2.8<L>  /  TBili  0.3  /  DBili  x   /  AST  20  /  ALT  8   /  AlkPhos  108  22 @ 08:45  TPro  5.7<L>  /  Alb  2.8<L>  /  TBili  0.2  /  DBili  x   /  AST  19  /  ALT  7   /  AlkPhos  115  22 @ 01:30  TPro  6.6  /  Alb  3.4  /  TBili  <0.2  /  DBili  x   /  AST  16  /  ALT  15  /  AlkPhos  129<H>  22 @ 13:10          Physical exam:  Gen: NAD  Abdomen: Soft, nontender, no distension , firm uterine fundus at umbilicus.  Incision: Clean, dry, and intact   Pelvic: Normal lochia noted  Ext: No calf tenderness    
Postpartum Note,  Section  She is a  41y woman who is now post-operative day: 3    Subjective:  The patient feels well.  She is ambulating.   She is tolerating regular diet.  She denies nausea and vomiting.  She is voiding.  Her pain is controlled.  She reports normal postpartum bleeding.    Vital Signs Last 24 Hrs  T(C): 37.1 (2022 05:40), Max: 37.5 (2022 21:54)  T(F): 98.8 (2022 05:40), Max: 99.5 (2022 21:54)  HR: 97 (2022 05:40) (97 - 103)  BP: 140/78 (2022 05:40) (117/68 - 140/78)  BP(mean): 92 (2022 05:40) (92 - 92)  RR: 20 (2022 05:40) (18 - 20)  SpO2: 100% (2022 05:40) (100% - 100%)    Physical exam:  Gen: NAD  Breast: Soft, nontender, not engorged.  Abdomen: Soft, nontender, no distension , firm uterine fundus at umbilicus.  Incision: Clean, dry, and intact with steri strips  Pelvic: Normal lochia noted  Ext: No calf tenderness    LABS:                        8.6    14.13 )-----------( 291      ( 2022 09:10 )             25.9         Allergies    No Known Allergies    Intolerances      MEDICATIONS  (STANDING):  acetaminophen     Tablet .. 975 milliGRAM(s) Oral <User Schedule>  diphtheria/tetanus/pertussis (acellular) Vaccine (ADAcel) 0.5 milliLiter(s) IntraMuscular once  heparin   Injectable 5000 Unit(s) SubCutaneous every 12 hours  ibuprofen  Tablet. 600 milliGRAM(s) Oral every 6 hours  labetalol 200 milliGRAM(s) Oral two times a day    MEDICATIONS  (PRN):  dexAMETHasone  Injectable 4 milliGRAM(s) IV Push every 6 hours PRN Nausea  diphenhydrAMINE 25 milliGRAM(s) Oral every 6 hours PRN Pruritus  diphenhydrAMINE 25 milliGRAM(s) Oral every 6 hours PRN Rash and/or Itching  lanolin Ointment 1 Application(s) Topical every 6 hours PRN Sore Nipples  magnesium hydroxide Suspension 30 milliLiter(s) Oral two times a day PRN Constipation  nalbuphine Injectable 2.5 milliGRAM(s) IV Push every 6 hours PRN Pruritus  naloxone Injectable 0.1 milliGRAM(s) IV Push every 3 minutes PRN For ANY of the following changes in patient status:  A. Breaths Per Minute LESS THAN 10, B. Oxygen saturation LESS THAN 90%, C. Sedation score of 6 for Stop After: 4 Times  ondansetron Injectable 4 milliGRAM(s) IV Push every 6 hours PRN Nausea  oxyCODONE    IR 5 milliGRAM(s) Oral every 3 hours PRN Moderate to Severe Pain (4-10)  oxyCODONE    IR 5 milliGRAM(s) Oral once PRN Moderate to Severe Pain (4-10)  simethicone 80 milliGRAM(s) Chew every 4 hours PRN Gas        Assessment and Plan  POD #3 s/p  section  Doing well.  Encourage ambulation.        
MFM Consult Note     40yo  at 35w6d with cHTN on labetalol, GDMA1, IUGR and found to have oligo today in the ATU. Patient endorses normal fetal movement, she denies LOF, VB or contractions. She states that her BP has been well controlled on her labetalol 200 BID at home. Patient is overall feeling well.     ICU Vital Signs Last 24 Hrs  T(C): 37.1 (2022 12:34), Max: 37.1 (2022 12:14)  T(F): 98.78 (2022 12:34), Max: 98.8 (2022 12:14)  HR: 89 (2022 13:39) (86 - 90)  BP: 159/116 (2022 13:39) (136/75 - 159/116)  BP(mean): --  ABP: --  ABP(mean): --  RR: 17 (2022 12:34) (17 - 17)  SpO2: --    Gen: NAD   Pulm: nonlabored breathing   CV: RRR   Abd: soft, nontender     TAUS: bedside ultrasound done and no pocket of fluid could be appreciated   EFW today 10%ile with AC 5%ile     EFM: baseline 140, mod meenu, +Accels +intermittent decels   [place]: q 5m contractions, patient is not feeling     
OB Postpartum Note:  Delivery, POD#3    S: 42yo POD#3 s/p pLTCS + myomectomy with J incision 2/2 cat 2 tracing. Delivery c/b siPEC/Mg The patient feels well. Post partum course c/b acute blood loss anemia for which she received 2upRBC  Pain is well controlled. She is tolerating a regular diet and passing flatus. She is voiding spontaneously, and ambulating without difficulty. Denies CP/SOB. Denies lightheadedness/dizziness. Denies N/V. Denies HA, changes in vision, RUQ pain, epigastric pain    O:  Vitals:  Vital Signs Last 24 Hrs  T(C): 37.1 (2022 05:40), Max: 37.5 (2022 21:54)  T(F): 98.8 (2022 05:40), Max: 99.5 (2022 21:54)  HR: 97 (2022 05:40) (97 - 103)  BP: 140/78 (2022 05:40) (117/68 - 140/78)  BP(mean): 92 (2022 05:40) (92 - 92)  RR: 20 (2022 05:40) (18 - 20)  SpO2: 100% (2022 05:40) (100% - 100%)    MEDICATIONS  (STANDING):  acetaminophen     Tablet .. 975 milliGRAM(s) Oral <User Schedule>  diphtheria/tetanus/pertussis (acellular) Vaccine (ADAcel) 0.5 milliLiter(s) IntraMuscular once  heparin   Injectable 5000 Unit(s) SubCutaneous every 12 hours  ibuprofen  Tablet. 600 milliGRAM(s) Oral every 6 hours  labetalol 200 milliGRAM(s) Oral two times a day    MEDICATIONS  (PRN):  dexAMETHasone  Injectable 4 milliGRAM(s) IV Push every 6 hours PRN Nausea  diphenhydrAMINE 25 milliGRAM(s) Oral every 6 hours PRN Pruritus  diphenhydrAMINE 25 milliGRAM(s) Oral every 6 hours PRN Rash and/or Itching  lanolin Ointment 1 Application(s) Topical every 6 hours PRN Sore Nipples  magnesium hydroxide Suspension 30 milliLiter(s) Oral two times a day PRN Constipation  nalbuphine Injectable 2.5 milliGRAM(s) IV Push every 6 hours PRN Pruritus  naloxone Injectable 0.1 milliGRAM(s) IV Push every 3 minutes PRN For ANY of the following changes in patient status:  A. Breaths Per Minute LESS THAN 10, B. Oxygen saturation LESS THAN 90%, C. Sedation score of 6 for Stop After: 4 Times  ondansetron Injectable 4 milliGRAM(s) IV Push every 6 hours PRN Nausea  oxyCODONE    IR 5 milliGRAM(s) Oral every 3 hours PRN Moderate to Severe Pain (4-10)  oxyCODONE    IR 5 milliGRAM(s) Oral once PRN Moderate to Severe Pain (4-10)  simethicone 80 milliGRAM(s) Chew every 4 hours PRN Gas      LABS:  Blood type: O Positive  Rubella IgG: RPR: Negative                          8.6<L>   14.13<H> >-----------< 291    (  @ 09:10 )             25.9<L>                        6.5<LL>   11.86<H> >-----------< 293    (  @ 14:45 )             20.9<LL>                        7.1<L>   12.19<H> >-----------< 278    (  08:45 )             22.5<L>                        8.2<L>   16.30<H> >-----------< 294    (  @ 01:30 )             27.2<L>                        8.7<L>   8.19 >-----------< 316    (  @ 13:10 )             27.6<L>    22 @ 08:45      129<L>  |  99  |  3<L>  ----------------------------<  128<H>  3.7   |  20<L>  |  0.53    22 @ 01:30      136  |  104  |  3<L>  ----------------------------<  93  3.9   |  20<L>  |  0.57    22 @ 13:10      137  |  104  |  4<L>  ----------------------------<  69<L>  3.9   |  20<L>  |  0.57        Ca    7.7<L>      2022 08:45  Ca    8.3<L>      2022 01:30  Ca    9.3      2022 13:10  Mg     5.20<H>     06-07  Mg     4.60<H>     06-07  Mg     4.50<H>     06-07  Mg     3.20<H>     -  Mg     3.20<H>     -07    TPro  5.4<L>  /  Alb  2.8<L>  /  TBili  0.3  /  DBili  x   /  AST  20  /  ALT  8   /  AlkPhos  108  22 @ 08:45  TPro  5.7<L>  /  Alb  2.8<L>  /  TBili  0.2  /  DBili  x   /  AST  19  /  ALT  7   /  AlkPhos  115  22 @ 01:30  TPro  6.6  /  Alb  3.4  /  TBili  <0.2  /  DBili  x   /  AST  16  /  ALT  15  /  AlkPhos  129<H>  22 @ 13:10          Physical exam:  Gen: NAD  Abdomen: Soft, nontender, no distension , firm uterine fundus at umbilicus.  Incision: Clean, dry, and intact   Pelvic: Normal lochia noted  Ext: No calf tenderness    
OB Progress Note:  Delivery, POD#1    S: 42yo POD#1 s/p pLTCS 2/2 cat2 tracing. Delivery c/b siPEC Her pain is well controlled. She is tolerating a regular diet but is not yet passing flatus. Denies N/V. Denies CP/SOB/lightheadedness/dizziness.   Bill in situ, patient has not ambulated. Dneis GONZALES, changes in vision, RUQ pain, epigastric pain  O:   Vital Signs Last 24 Hrs  T(C): 36.6 (2022 06:05), Max: 37.1 (2022 12:14)  T(F): 97.9 (2022 06:05), Max: 98.8 (2022 12:14)  HR: 73 (2022 06:05) (71 - 100)  BP: 141/76 (2022 06:05) (94/63 - 182/79)  BP(mean): 78 (2022 04:00) (67 - 96)  RR: 16 (2022 06:05) (14 - 24)  SpO2: 99% (2022 06:05) (89% - 100%)    Labs:  Blood type: O Positive  Rubella IgG: RPR: Negative                          8.2<L>   16.30<H> >-----------< 294    (  @ 01:30 )             27.2<L>                        8.7<L>   8.19 >-----------< 316    (  @ 13:10 )             27.6<L>    22 @ 01:30      136  |  104  |  3<L>  ----------------------------<  93  3.9   |  20<L>  |  0.57    22 @ 13:10      137  |  104  |  4<L>  ----------------------------<  69<L>  3.9   |  20<L>  |  0.57        Ca    8.3<L>      2022 01:30  Ca    9.3      2022 13:10  Mg     3.20<H>       Mg     3.20<H>         TPro  5.7<L>  /  Alb  2.8<L>  /  TBili  0.2  /  DBili  x   /  AST  19  /  ALT  7   /  AlkPhos  115  22 @ 01:30  TPro  6.6  /  Alb  3.4  /  TBili  <0.2  /  DBili  x   /  AST  16  /  ALT  15  /  AlkPhos  129<H>  22 @ 13:10          PE:  General: NAD  Abdomen: Mildly distended, appropriately tender, incision c/d/i.  Extremities: No erythema, no pitting edema    
OB Progress Note: LTCS, POD#2    S: 42yo POD#2 s/p pLTCS 2/2 cat 2 tracing. Delivery c/b siPEC Pain is well controlled. She is tolerating a regular diet and passing flatus. She is voiding spontaneously, and ambulating without difficulty. Denies CP/SOB. Denies lightheadedness/dizziness. Denies N/V. Denies HA, changes in vision, RUQ pain, epigastric pain    O:  Vitals:  Vital Signs Last 24 Hrs  T(C): 37.6 (08 Jun 2022 05:40), Max: 37.6 (08 Jun 2022 05:40)  T(F): 99.7 (08 Jun 2022 05:40), Max: 99.7 (08 Jun 2022 05:40)  HR: 98 (08 Jun 2022 05:40) (82 - 98)  BP: 134/63 (08 Jun 2022 05:40) (107/56 - 134/63)  BP(mean): --  RR: 18 (08 Jun 2022 05:40) (17 - 18)  SpO2: 100% (08 Jun 2022 05:40) (99% - 100%)    MEDICATIONS  (STANDING):  acetaminophen     Tablet .. 975 milliGRAM(s) Oral <User Schedule>  diphtheria/tetanus/pertussis (acellular) Vaccine (ADAcel) 0.5 milliLiter(s) IntraMuscular once  heparin   Injectable 5000 Unit(s) SubCutaneous every 12 hours  ibuprofen  Tablet. 600 milliGRAM(s) Oral every 6 hours  labetalol 200 milliGRAM(s) Oral two times a day      MEDICATIONS  (PRN):  dexAMETHasone  Injectable 4 milliGRAM(s) IV Push every 6 hours PRN Nausea  diphenhydrAMINE 25 milliGRAM(s) Oral every 6 hours PRN Pruritus  diphenhydrAMINE 25 milliGRAM(s) Oral every 6 hours PRN Rash and/or Itching  lanolin Ointment 1 Application(s) Topical every 6 hours PRN Sore Nipples  magnesium hydroxide Suspension 30 milliLiter(s) Oral two times a day PRN Constipation  nalbuphine Injectable 2.5 milliGRAM(s) IV Push every 6 hours PRN Pruritus  naloxone Injectable 0.1 milliGRAM(s) IV Push every 3 minutes PRN For ANY of the following changes in patient status:  A. Breaths Per Minute LESS THAN 10, B. Oxygen saturation LESS THAN 90%, C. Sedation score of 6 for Stop After: 4 Times  ondansetron Injectable 4 milliGRAM(s) IV Push every 6 hours PRN Nausea  oxyCODONE    IR 5 milliGRAM(s) Oral every 3 hours PRN Mild Pain (1 - 3)  oxyCODONE    IR 10 milliGRAM(s) Oral every 3 hours PRN Moderate Pain (4 - 6)  oxyCODONE    IR 5 milliGRAM(s) Oral every 3 hours PRN Moderate to Severe Pain (4-10)  oxyCODONE    IR 5 milliGRAM(s) Oral once PRN Moderate to Severe Pain (4-10)  simethicone 80 milliGRAM(s) Chew every 4 hours PRN Gas      Labs:  Blood type: O Positive  Rubella IgG: RPR: Negative                          6.5<LL>   11.86<H> >-----------< 293    ( 06-07 @ 14:45 )             20.9<LL>                        7.1<L>   12.19<H> >-----------< 278    ( 06-07 @ 08:45 )             22.5<L>                        8.2<L>   16.30<H> >-----------< 294    ( 06-07 @ 01:30 )             27.2<L>                        8.7<L>   8.19 >-----------< 316    ( 06-06 @ 13:10 )             27.6<L>    06-07-22 @ 08:45      129<L>  |  99  |  3<L>  ----------------------------<  128<H>  3.7   |  20<L>  |  0.53    06-07-22 @ 01:30      136  |  104  |  3<L>  ----------------------------<  93  3.9   |  20<L>  |  0.57    06-06-22 @ 13:10      137  |  104  |  4<L>  ----------------------------<  69<L>  3.9   |  20<L>  |  0.57        Ca    7.7<L>      07 Jun 2022 08:45  Ca    8.3<L>      07 Jun 2022 01:30  Ca    9.3      06 Jun 2022 13:10  Mg     5.20<H>     06-07  Mg     4.60<H>     06-07  Mg     4.50<H>     06-07  Mg     3.20<H>     06-07  Mg     3.20<H>     06-07    TPro  5.4<L>  /  Alb  2.8<L>  /  TBili  0.3  /  DBili  x   /  AST  20  /  ALT  8   /  AlkPhos  108  06-07-22 @ 08:45  TPro  5.7<L>  /  Alb  2.8<L>  /  TBili  0.2  /  DBili  x   /  AST  19  /  ALT  7   /  AlkPhos  115  06-07-22 @ 01:30  TPro  6.6  /  Alb  3.4  /  TBili  <0.2  /  DBili  x   /  AST  16  /  ALT  15  /  AlkPhos  129<H>  06-06-22 @ 13:10          PE:  General: NAD  Abdomen: Soft, appropriately tender, incision c/d/i.  Extremities: No erythema, no pitting edema

## 2022-06-13 ENCOUNTER — APPOINTMENT (OUTPATIENT)
Dept: ANTEPARTUM | Facility: CLINIC | Age: 42
End: 2022-06-13

## 2022-06-13 ENCOUNTER — APPOINTMENT (OUTPATIENT)
Dept: MATERNAL FETAL MEDICINE | Facility: CLINIC | Age: 42
End: 2022-06-13

## 2022-06-16 ENCOUNTER — APPOINTMENT (OUTPATIENT)
Dept: ANTEPARTUM | Facility: CLINIC | Age: 42
End: 2022-06-16

## 2022-06-20 ENCOUNTER — APPOINTMENT (OUTPATIENT)
Dept: ANTEPARTUM | Facility: CLINIC | Age: 42
End: 2022-06-20

## 2022-06-23 ENCOUNTER — APPOINTMENT (OUTPATIENT)
Dept: ANTEPARTUM | Facility: CLINIC | Age: 42
End: 2022-06-23

## 2022-06-23 ENCOUNTER — EMERGENCY (EMERGENCY)
Facility: HOSPITAL | Age: 42
LOS: 1 days | Discharge: HOME | End: 2022-06-23
Attending: EMERGENCY MEDICINE | Admitting: EMERGENCY MEDICINE
Payer: MEDICAID

## 2022-06-23 VITALS
HEIGHT: 63 IN | HEART RATE: 97 BPM | RESPIRATION RATE: 19 BRPM | SYSTOLIC BLOOD PRESSURE: 155 MMHG | DIASTOLIC BLOOD PRESSURE: 98 MMHG | OXYGEN SATURATION: 100 % | TEMPERATURE: 98 F

## 2022-06-23 VITALS
HEART RATE: 87 BPM | DIASTOLIC BLOOD PRESSURE: 94 MMHG | TEMPERATURE: 98 F | SYSTOLIC BLOOD PRESSURE: 155 MMHG | RESPIRATION RATE: 17 BRPM | OXYGEN SATURATION: 97 %

## 2022-06-23 LAB
ALBUMIN SERPL ELPH-MCNC: 3.1 G/DL — LOW (ref 3.3–5)
ALP SERPL-CCNC: 124 U/L — HIGH (ref 40–120)
ALT FLD-CCNC: 21 U/L — SIGNIFICANT CHANGE UP (ref 4–33)
ANION GAP SERPL CALC-SCNC: 14 MMOL/L — SIGNIFICANT CHANGE UP (ref 7–14)
ANISOCYTOSIS BLD QL: SLIGHT — SIGNIFICANT CHANGE UP
APPEARANCE UR: CLEAR — SIGNIFICANT CHANGE UP
APTT BLD: 27.9 SEC — SIGNIFICANT CHANGE UP (ref 27–36.3)
AST SERPL-CCNC: 23 U/L — SIGNIFICANT CHANGE UP (ref 4–32)
BACTERIA # UR AUTO: ABNORMAL
BASE EXCESS BLDV CALC-SCNC: 0.7 MMOL/L — SIGNIFICANT CHANGE UP (ref -2–3)
BASOPHILS # BLD AUTO: 0 K/UL — SIGNIFICANT CHANGE UP (ref 0–0.2)
BASOPHILS NFR BLD AUTO: 0 % — SIGNIFICANT CHANGE UP (ref 0–2)
BILIRUB SERPL-MCNC: 0.2 MG/DL — SIGNIFICANT CHANGE UP (ref 0.2–1.2)
BILIRUB UR-MCNC: NEGATIVE — SIGNIFICANT CHANGE UP
BLOOD GAS VENOUS COMPREHENSIVE RESULT: SIGNIFICANT CHANGE UP
BUN SERPL-MCNC: 4 MG/DL — LOW (ref 7–23)
CALCIUM SERPL-MCNC: 9 MG/DL — SIGNIFICANT CHANGE UP (ref 8.4–10.5)
CHLORIDE BLDV-SCNC: 108 MMOL/L — SIGNIFICANT CHANGE UP (ref 96–108)
CHLORIDE SERPL-SCNC: 105 MMOL/L — SIGNIFICANT CHANGE UP (ref 98–107)
CO2 BLDV-SCNC: 27.4 MMOL/L — HIGH (ref 22–26)
CO2 SERPL-SCNC: 24 MMOL/L — SIGNIFICANT CHANGE UP (ref 22–31)
COLOR SPEC: SIGNIFICANT CHANGE UP
CREAT SERPL-MCNC: 0.72 MG/DL — SIGNIFICANT CHANGE UP (ref 0.5–1.3)
DIFF PNL FLD: ABNORMAL
EGFR: 108 ML/MIN/1.73M2 — SIGNIFICANT CHANGE UP
EOSINOPHIL # BLD AUTO: 0.09 K/UL — SIGNIFICANT CHANGE UP (ref 0–0.5)
EOSINOPHIL NFR BLD AUTO: 0.9 % — SIGNIFICANT CHANGE UP (ref 0–6)
EPI CELLS # UR: 1 /HPF — SIGNIFICANT CHANGE UP (ref 0–5)
FLUAV AG NPH QL: SIGNIFICANT CHANGE UP
FLUBV AG NPH QL: SIGNIFICANT CHANGE UP
GAS PNL BLDV: 141 MMOL/L — SIGNIFICANT CHANGE UP (ref 136–145)
GLUCOSE BLDV-MCNC: SIGNIFICANT CHANGE UP MG/DL (ref 70–99)
GLUCOSE SERPL-MCNC: 104 MG/DL — HIGH (ref 70–99)
GLUCOSE UR QL: NEGATIVE — SIGNIFICANT CHANGE UP
HCO3 BLDV-SCNC: 26 MMOL/L — SIGNIFICANT CHANGE UP (ref 22–29)
HCT VFR BLD CALC: 26.1 % — LOW (ref 34.5–45)
HCT VFR BLDA CALC: 27 % — LOW (ref 34.5–46.5)
HGB BLD CALC-MCNC: 8.9 G/DL — LOW (ref 11.5–15.5)
HGB BLD-MCNC: 8.1 G/DL — LOW (ref 11.5–15.5)
HYALINE CASTS # UR AUTO: 0 /LPF — SIGNIFICANT CHANGE UP (ref 0–7)
IANC: 5.16 K/UL — SIGNIFICANT CHANGE UP (ref 1.8–7.4)
INR BLD: 1.08 RATIO — SIGNIFICANT CHANGE UP (ref 0.88–1.16)
KETONES UR-MCNC: NEGATIVE — SIGNIFICANT CHANGE UP
LACTATE BLDV-MCNC: 1.3 MMOL/L — SIGNIFICANT CHANGE UP (ref 0.5–2)
LEUKOCYTE ESTERASE UR-ACNC: ABNORMAL
LYMPHOCYTES # BLD AUTO: 2.94 K/UL — SIGNIFICANT CHANGE UP (ref 1–3.3)
LYMPHOCYTES # BLD AUTO: 31 % — SIGNIFICANT CHANGE UP (ref 13–44)
MACROCYTES BLD QL: SLIGHT — SIGNIFICANT CHANGE UP
MCHC RBC-ENTMCNC: 26.4 PG — LOW (ref 27–34)
MCHC RBC-ENTMCNC: 31 GM/DL — LOW (ref 32–36)
MCV RBC AUTO: 85 FL — SIGNIFICANT CHANGE UP (ref 80–100)
MONOCYTES # BLD AUTO: 0.5 K/UL — SIGNIFICANT CHANGE UP (ref 0–0.9)
MONOCYTES NFR BLD AUTO: 5.3 % — SIGNIFICANT CHANGE UP (ref 2–14)
NEUTROPHILS # BLD AUTO: 5.78 K/UL — SIGNIFICANT CHANGE UP (ref 1.8–7.4)
NEUTROPHILS NFR BLD AUTO: 61 % — SIGNIFICANT CHANGE UP (ref 43–77)
NITRITE UR-MCNC: NEGATIVE — SIGNIFICANT CHANGE UP
PCO2 BLDV: 44 MMHG — HIGH (ref 39–42)
PH BLDV: 7.38 — SIGNIFICANT CHANGE UP (ref 7.32–7.43)
PH UR: 6.5 — SIGNIFICANT CHANGE UP (ref 5–8)
PLAT MORPH BLD: NORMAL — SIGNIFICANT CHANGE UP
PLATELET # BLD AUTO: 811 K/UL — HIGH (ref 150–400)
PO2 BLDV: 33 MMHG — SIGNIFICANT CHANGE UP
POTASSIUM BLDV-SCNC: 3.5 MMOL/L — SIGNIFICANT CHANGE UP (ref 3.5–5.1)
POTASSIUM SERPL-MCNC: 3.3 MMOL/L — LOW (ref 3.5–5.3)
POTASSIUM SERPL-SCNC: 3.3 MMOL/L — LOW (ref 3.5–5.3)
PROT SERPL-MCNC: 7.1 G/DL — SIGNIFICANT CHANGE UP (ref 6–8.3)
PROT UR-MCNC: ABNORMAL
PROTHROM AB SERPL-ACNC: 12.5 SEC — SIGNIFICANT CHANGE UP (ref 10.5–13.4)
RBC # BLD: 3.07 M/UL — LOW (ref 3.8–5.2)
RBC # FLD: 18.7 % — HIGH (ref 10.3–14.5)
RBC BLD AUTO: ABNORMAL
RBC CASTS # UR COMP ASSIST: 5 /HPF — HIGH (ref 0–4)
RSV RNA NPH QL NAA+NON-PROBE: SIGNIFICANT CHANGE UP
SAO2 % BLDV: 50.7 % — SIGNIFICANT CHANGE UP
SARS-COV-2 RNA SPEC QL NAA+PROBE: SIGNIFICANT CHANGE UP
SODIUM SERPL-SCNC: 143 MMOL/L — SIGNIFICANT CHANGE UP (ref 135–145)
SP GR SPEC: 1.01 — SIGNIFICANT CHANGE UP (ref 1–1.05)
UROBILINOGEN FLD QL: SIGNIFICANT CHANGE UP
VARIANT LYMPHS # BLD: 1.8 % — SIGNIFICANT CHANGE UP (ref 0–6)
WBC # BLD: 9.48 K/UL — SIGNIFICANT CHANGE UP (ref 3.8–10.5)
WBC # FLD AUTO: 9.48 K/UL — SIGNIFICANT CHANGE UP (ref 3.8–10.5)
WBC UR QL: 34 /HPF — HIGH (ref 0–5)

## 2022-06-23 PROCEDURE — 99285 EMERGENCY DEPT VISIT HI MDM: CPT

## 2022-06-23 PROCEDURE — 74177 CT ABD & PELVIS W/CONTRAST: CPT | Mod: 26,MA

## 2022-06-23 RX ORDER — VANCOMYCIN HCL 1 G
1000 VIAL (EA) INTRAVENOUS ONCE
Refills: 0 | Status: COMPLETED | OUTPATIENT
Start: 2022-06-23 | End: 2022-06-23

## 2022-06-23 RX ORDER — SODIUM CHLORIDE 9 MG/ML
1000 INJECTION, SOLUTION INTRAVENOUS ONCE
Refills: 0 | Status: COMPLETED | OUTPATIENT
Start: 2022-06-23 | End: 2022-06-23

## 2022-06-23 RX ORDER — ACETAMINOPHEN 500 MG
1000 TABLET ORAL ONCE
Refills: 0 | Status: COMPLETED | OUTPATIENT
Start: 2022-06-23 | End: 2022-06-23

## 2022-06-23 RX ORDER — SODIUM CHLORIDE 9 MG/ML
1000 INJECTION INTRAMUSCULAR; INTRAVENOUS; SUBCUTANEOUS ONCE
Refills: 0 | Status: COMPLETED | OUTPATIENT
Start: 2022-06-23 | End: 2022-06-23

## 2022-06-23 RX ORDER — ONDANSETRON 8 MG/1
4 TABLET, FILM COATED ORAL ONCE
Refills: 0 | Status: COMPLETED | OUTPATIENT
Start: 2022-06-23 | End: 2022-06-23

## 2022-06-23 RX ORDER — PIPERACILLIN AND TAZOBACTAM 4; .5 G/20ML; G/20ML
3.38 INJECTION, POWDER, LYOPHILIZED, FOR SOLUTION INTRAVENOUS ONCE
Refills: 0 | Status: COMPLETED | OUTPATIENT
Start: 2022-06-23 | End: 2022-06-23

## 2022-06-23 RX ADMIN — SODIUM CHLORIDE 1000 MILLILITER(S): 9 INJECTION, SOLUTION INTRAVENOUS at 21:13

## 2022-06-23 RX ADMIN — SODIUM CHLORIDE 1000 MILLILITER(S): 9 INJECTION INTRAMUSCULAR; INTRAVENOUS; SUBCUTANEOUS at 17:42

## 2022-06-23 RX ADMIN — ONDANSETRON 4 MILLIGRAM(S): 8 TABLET, FILM COATED ORAL at 18:23

## 2022-06-23 RX ADMIN — Medication 250 MILLIGRAM(S): at 18:23

## 2022-06-23 RX ADMIN — PIPERACILLIN AND TAZOBACTAM 200 GRAM(S): 4; .5 INJECTION, POWDER, LYOPHILIZED, FOR SOLUTION INTRAVENOUS at 19:56

## 2022-06-23 RX ADMIN — Medication 400 MILLIGRAM(S): at 17:42

## 2022-06-23 NOTE — CONSULT NOTE ADULT - ASSESSMENT
40yo  POD#17 s/p pLTCS 2/2 NRFHT presents for evaluation of incisional drainage, fevers, and abdominal pain.    Recommendations     42yo  POD#17 s/p pLTCS 2/2 NRFHT presents for evaluation of incisional drainage, fevers, and abdominal pain. Incision with 2 defects draining yellow-brown fluid. Pt is afebrile without evidence of leukocytosis     Recommendations  - Follow up results from CT A/P   - Continue IV antibiotics. Currently received Vancomycin x1. Receiving Zosyn (- )   - Pain control per ED.       DW: Dr. Elisa Yepez, PGY-2    42yo  POD#17 s/p pLTCS 2/2 NRFHT presents for evaluation of incisional drainage, fevers, and abdominal pain. Incision with 2 defects draining yellow-brown fluid. Pt is afebrile without evidence of leukocytosis     Recommendations  - Follow up results from CT A/P   - Continue IV antibiotics. Currently received Vancomycin x1. Receiving Zosyn (- )   - Pain control per ED.       DW: Dr. Elisa Yepez, PGY-2     **R2 UPDATE**  CTAP resulted and does not show hematoma, loculations, abscess or other pelvic collections. Incision still draining yellow-brown tinged fluid at the corners where skin is dehisced but underlying subcutaneous tissue is intact a few millimeters below skin surface. Incisions packed in the corners bilaterally and abdominal pad and gauze placed on top. Patient hemodynamically stable. Patient strongly encouraged to follow up with OBGYN tomorrow to set up home care to change packing daily. Patient instructed to return to ED if unable to go to office or get home care set up tomorrow. Patient had subjective fevers and chills at home although she has been afebrile without leukocytosis here. Will treat with augmentin for 7 days.    Pt seen and d/w Dr. Ludivina Joe, PGY1

## 2022-06-23 NOTE — ED PROVIDER NOTE - PROGRESS NOTE DETAILS
Aurea Sorto, PGY-1, EM:  Discussed pt with gyn resident. They will evaluate pt. Marifer-PGY3: pt received at sign-out, seen and evaluated at bedside. Pt seen by GYN, recommending PO abx and follow up outpatient tomorrow with Dr. Pérez. Pt understood and agreeable with plan.

## 2022-06-23 NOTE — ED PROVIDER NOTE - NSFOLLOWUPINSTRUCTIONS_ED_ALL_ED_FT
Rest and stay well hydrated.    Follow up with Dr. Florence tomorrow.     Take over the counter acetaminophen (Tylenol) 650-1000 mg every 4-6 hours as needed for pain. Do not take more than 3000 mg in a 24 hour period. Be aware many over the counter and prescription medications also contain acetaminophen (Tylenol).     Return immediately with any new or worsening symptoms including severe pain, fevers, chest pain, shortness of breath, worsening discharge, or any additional concerns.

## 2022-06-23 NOTE — ED PROVIDER NOTE - PATIENT PORTAL LINK FT
You can access the FollowMyHealth Patient Portal offered by [facility] by registering at the following website: [email or web address]. By joining [facility]’s FollowMyHealth portal, you will also be able to view your health information using other applications (apps) compatible with our system.

## 2022-06-23 NOTE — ED PROVIDER NOTE - CLINICAL SUMMARY MEDICAL DECISION MAKING FREE TEXT BOX
40 yo F with PMH of HTN,  presenting after a  delivery 2 weeks ago with complaint of discharge from her surgical site, subjective fevers and abdominal pain. Differential diagnosis includes but is not limited to seroma, surgical site infection/abscess, endometritis. Pt has large amounts of discharge and some dehiscence. Will consult OB. will obtain cultures, urine studies, labs, give ABX/fluids/analgesia, get CTAP. Dispo pending w/u, pt may require intervention, will need to have her site closed.

## 2022-06-23 NOTE — ED ADULT NURSE NOTE - OBJECTIVE STATEMENT
a&ox4, c/o lower abd pain, distension and 3 days of yellow drainage from op wd. had c sec 6/6. about 2 cm open wd noted with yellow drainage.  afebrile at present but pt felt hot at nights. cx's sent. waiting for ct eval. will give antibiotics acordingly

## 2022-06-23 NOTE — ED PROVIDER NOTE - ATTENDING CONTRIBUTION TO CARE
Otherwise healthy female s/p  2 weeks ago p/w subjective fevers at home, wound dehiscence and purulent drainage from wound. Pt well appearing, mild tenderness on abd exam but wound actively draining. C/f abscess/seroma- currently afeb rectally, last took ibuprofen at around 11am. Plan for labs, cultures, CTAP, gyn consult.

## 2022-06-23 NOTE — ED PROVIDER NOTE - OBJECTIVE STATEMENT
42 yo F with PMH of HTN,  presenting after a  delivery 2 weeks ago with complaint of discharge from her surgical site, subjective fevers and abdominal pain. Pt states she has had continuous drainage from her surgical site. She has been taking ibuprofen for the pain. She never took her temp but has felt warm. + vomiting. Pt has scant bleeding from her vagina. Pt states her abdomen has become more distended. Initially got smaller post op but has progressively grown larger. No abx use.

## 2022-06-23 NOTE — ED ADULT TRIAGE NOTE - CHIEF COMPLAINT QUOTE
pt s/p  on . pt reports yellow colored discharge from incision site x 2 weeks. pt also reports epigastric pain and fevers. pt denies sob, chest pain, n/v/d. PMH HTN

## 2022-06-23 NOTE — ED PROVIDER NOTE - PHYSICAL EXAMINATION
- (Chaperone: Thiago Posada RN) scant brown red blood on pad. External labia wnl. Bimanual exam with R adnexal tenderness, uterus wnl.

## 2022-06-23 NOTE — CONSULT NOTE ADULT - SUBJECTIVE AND OBJECTIVE BOX
LAURA HILARIO  41y  Female [id number]    HPI: 42yo  POD#17 s/p pLTCS 2/2 NRFHT presents for evaluation of incisional drainage, fevers, and abdominal pain.     Name of GYN Physician: Dr. Sugar Bates    POB:    - SAB x6, 1 D&C  - 22: pLTCS w/ J incision 2/2 NRFHT, myomectomy performed at the same time, admission c/b GDMA1, siPEC/Mg and acute blood loss anemia requiring 2u PRBC postoperatively     Pgyn: h/o ovarian cyst s/p cystectomy. h/o fibroids s/p myomectomy at time of C/S. Denies endometriosis, STI's, Abnormal pap smears     Home meds: Labetalol 200mg BID    Hospital Meds:   MEDICATIONS  (STANDING):  ondansetron Injectable 4 milliGRAM(s) IV Push once  piperacillin/tazobactam IVPB... 3.375 Gram(s) IV Intermittent once  vancomycin  IVPB. 1000 milliGRAM(s) IV Intermittent once    Allergies  No Known Allergies    PAST MEDICAL & SURGICAL HISTORY:  Chronic Hypertension  s/p ovarian cystectomy  s/p D&C  s/p C/S+myomectomy     Social History:  Denies smoking use, drug use, alcohol use.      Vital Signs Last 24 Hrs  T(C): 36.6 (2022 15:36), Max: 36.6 (2022 15:36)  T(F): 97.9 (2022 15:36), Max: 97.9 (2022 15:36)  HR: 97 (2022 15:36) (97 - 97)  BP: 155/98 (2022 15:36) (155/98 - 155/98)  RR: 19 (2022 15:36) (19 - 19)  SpO2: 100% (2022 15:36) (100% - 100%)    Physical Exam:   General: sitting comfortably in bed, NAD   CV: RR S1S2 no m/r/g  Lungs: CTA b/l, good air flow b/l   Back: No CVA tenderness  Abd: Soft, non-tender, non-distended.  Incision:  : No bleeding on pad. External labia wnl. Bimanual exam with no cervical motion tenderness, uterus wnl, adnexa non palpable b/l. Cervix closed.  Speculum Exam: No active bleeding from os.   Ext: non-tender b/l, no edema     LABS:             8.1    9.48  )-----------( 811      ( 2022 17:20 )             26.1     PT/INR - ( 2022 17:20 )   PT: 12.5 sec;   INR: 1.08 ratio         PTT - ( 2022 17:20 )  PTT:27.9 sec  Urinalysis Basic - ( 2022 17:55 )    Color: Light Yellow / Appearance: Clear / S.013 / pH: x  Gluc: x / Ketone: Negative  / Bili: Negative / Urobili: <2 mg/dL   Blood: x / Protein: Trace / Nitrite: Negative   Leuk Esterase: Large / RBC: 5 /HPF / WBC 34 /HPF   Sq Epi: x / Non Sq Epi: 1 /HPF / Bacteria: Few        RADIOLOGY & ADDITIONAL STUDIES: LAURA HILARIO  41y  Female [id number]    HPI: 40yo  POD#17 s/p pLTCS 2/2 NRFHT presents for evaluation of incisional drainage, fevers, and abdominal pain.  Pt states that she started having subjective fevers / chills at home on . Pt has not taken her temperature at home. Abdominal pain is described as a "burning" pain along the level of the incision. Pt also noted post-surgical pain that has improved, but not completely resolved. On Monday, she initially noted yellow / white fluid leaking from her incision, this increases in quantity and becomes constant when the patient leans to her side. Pt has noted consistent symptoms since then, presenting today with concern that she could develop an infection. Pt reports increased abdominal distension since last week, self described as gas, despite passing gas and having regular BM. The increased distension gives the sensation "of my stomach in my throat" and gives difficulty taking a deep breath in when laying flat. Lochia has been decreasing in quantity now a dark red and small in volume.     Name of GYN Physician: Dr. Sugar Bates    POB:    - SAB x6, 1 D&C  - 22: pLTCS w/ J incision 2/2 NRFHT, myomectomy performed at the same time, admission c/b GDMA1, siPEC/Mg and acute blood loss anemia requiring 2u PRBC postoperatively     Pgyn: h/o ovarian cyst s/p cystectomy. h/o fibroids s/p myomectomy at time of C/S. Denies endometriosis, STI's, Abnormal pap smears     Home meds: Labetalol 200mg BID - pt reports only taking AM dose today.     Hospital Meds:   MEDICATIONS  (STANDING):  ondansetron Injectable 4 milliGRAM(s) IV Push once  piperacillin/tazobactam IVPB... 3.375 Gram(s) IV Intermittent once  vancomycin  IVPB. 1000 milliGRAM(s) IV Intermittent once    Allergies  No Known Allergies    PAST MEDICAL & SURGICAL HISTORY:  Chronic Hypertension  s/p ovarian cystectomy  s/p D&C  s/p C/S+myomectomy     Social History:  Denies smoking use, drug use, alcohol use.      Vital Signs Last 24 Hrs  T(C): 36.6 (2022 15:36), Max: 36.6 (2022 15:36)  T(F): 97.9 (2022 15:36), Max: 97.9 (2022 15:36)  HR: 97 (2022 15:36) (97 - 97)  BP: 155/98 (2022 15:36) (155/98 - 155/98)  RR: 19 (2022 15:36) (19 - 19)  SpO2: 100% (2022 15:36) (100% - 100%)    Physical Exam:   General: sitting comfortably in bed, NAD   CV: +2 pulse. Well perfused extremities.   Lungs: Non labored breathing. No orthopnea when laying flat.   Back: No CVA tenderness  Abd: Soft, mildly tender when expressing fluid from incision. No rebound or guarding. Moderately distended and tympanic.   Incision: Incision with a 1cm left defect. 2.5 right incisional defect. Drainage of yellow brown fluid upon lifting panus. Steady flow of fluid with abdominal pressure. Remainder of incision remains intact.    : No bleeding on pad. Lochia wnl.   Ext: non-tender b/l, +1 pitting edema.     LABS:             8.1    9.48  )-----------( 811      ( 2022 17:20 )             26.1     PT/INR - ( 2022 17:20 )   PT: 12.5 sec;   INR: 1.08 ratio         PTT - ( 2022 17:20 )  PTT:27.9 sec  Urinalysis Basic - ( 2022 17:55 )    Color: Light Yellow / Appearance: Clear / S.013 / pH: x  Gluc: x / Ketone: Negative  / Bili: Negative / Urobili: <2 mg/dL   Blood: x / Protein: Trace / Nitrite: Negative   Leuk Esterase: Large / RBC: 5 /HPF / WBC 34 /HPF   Sq Epi: x / Non Sq Epi: 1 /HPF / Bacteria: Few        RADIOLOGY & ADDITIONAL STUDIES: LAURA HILARIO  41y  Female [id number]    HPI: 40yo  POD#17 s/p pLTCS 2/2 NRFHT presents for evaluation of incisional drainage, fevers, and abdominal pain.  Pt states that she started having subjective fevers / chills at home on . Pt has not taken her temperature at home. Abdominal pain is described as a "burning" pain along the level of the incision. Pt also noted post-surgical pain that has improved, but not completely resolved. On Monday, she initially noted yellow / white fluid leaking from her incision, this increases in quantity and becomes constant when the patient leans to her side. Pt has noted consistent symptoms since then, presenting today with concern that she could develop an infection. Pt reports increased abdominal distension since last week, self described as gas, despite passing gas and having regular BM. The increased distension gives the sensation "of my stomach in my throat" and gives difficulty taking a deep breath in when laying flat. Lochia has been decreasing in quantity now a dark red and small in volume.     Name of GYN Physician: Dr. Sugar Bates    POB:    - SAB x6, 1 D&C  - 22: pLTCS w/ J incision 2/2 NRFHT, myomectomy performed at the same time, admission c/b GDMA1, siPEC/Mg and acute blood loss anemia requiring 2u PRBC postoperatively     Pgyn: h/o ovarian cyst s/p cystectomy. h/o fibroids s/p myomectomy at time of C/S. Denies endometriosis, STI's, Abnormal pap smears     Home meds: Labetalol 200mg BID - pt reports only taking AM dose today.     Hospital Meds:   MEDICATIONS  (STANDING):  ondansetron Injectable 4 milliGRAM(s) IV Push once  piperacillin/tazobactam IVPB... 3.375 Gram(s) IV Intermittent once  vancomycin  IVPB. 1000 milliGRAM(s) IV Intermittent once    Allergies  No Known Allergies    PAST MEDICAL & SURGICAL HISTORY:  Chronic Hypertension  s/p ovarian cystectomy  s/p D&C  s/p C/S+myomectomy     Social History:  Denies smoking use, drug use, alcohol use.      Vital Signs Last 24 Hrs  T(C): 36.6 (2022 15:36), Max: 36.6 (2022 15:36)  T(F): 97.9 (2022 15:36), Max: 97.9 (2022 15:36)  HR: 97 (2022 15:36) (97 - 97)  BP: 155/98 (2022 15:36) (155/98 - 155/98)  RR: 19 (2022 15:36) (19 - 19)  SpO2: 100% (2022 15:36) (100% - 100%)    Physical Exam:   General: sitting comfortably in bed, NAD   CV: +2 pulse. Well perfused extremities.   Lungs: Non labored breathing. No orthopnea when laying flat.   Back: No CVA tenderness  Abd: Soft, mildly tender when expressing fluid from incision. No rebound or guarding. Moderately distended and tympanic.   Incision: Incision with a 1cm left defect. 2.5 right incisional defect. Drainage of yellow brown fluid upon lifting panus. Steady flow of fluid with abdominal pressure. Remainder of incision remains intact.    : No bleeding on pad. Lochia wnl.   Ext: non-tender b/l, +1 pitting edema.     LABS:             8.1    9.48  )-----------( 811      ( 2022 17:20 )             26.1     PT/INR - ( 2022 17:20 )   PT: 12.5 sec;   INR: 1.08 ratio         PTT - ( 2022 17:20 )  PTT:27.9 sec  Urinalysis Basic - ( 2022 17:55 )    Color: Light Yellow / Appearance: Clear / S.013 / pH: x  Gluc: x / Ketone: Negative  / Bili: Negative / Urobili: <2 mg/dL   Blood: x / Protein: Trace / Nitrite: Negative   Leuk Esterase: Large / RBC: 5 /HPF / WBC 34 /HPF   Sq Epi: x / Non Sq Epi: 1 /HPF / Bacteria: Few        RADIOLOGY & ADDITIONAL STUDIES:    **R2 UPDate**  < from: CT Abdomen and Pelvis w/ IV Cont (22 @ 19:51) >  ACC: [id number] EXAM:  CT ABDOMEN AND PELVIS IC                          PROCEDURE DATE:  2022          INTERPRETATION:  CLINICAL INFORMATION:  2 weeks ago with fever   and discharge.    COMPARISON: None.    CONTRAST/COMPLICATIONS:  IVContrast: Omnipaque 350  60 cc administered   0 cc discarded  Oral Contrast: NONE  Complications: None reported at time of study completion    PROCEDURE:  CT of the Abdomen and Pelvis was performed.  Sagittal and coronal reformats were performed.    FINDINGS:  LOWER CHEST: Small bilateral pleural effusions with patchy lower lung   airspace opacities, right greater than left. Heart is mildly enlarged.   Trace pericardial fluid.    LIVER: Within normal limits.  BILE DUCTS: Normal caliber.  GALLBLADDER: Within normal limits.  SPLEEN: Within normal limits.  PANCREAS: Within normal limits.  ADRENALS: Within normal limits.  KIDNEYS/URETERS: Within normal limits.    BLADDER: Underdistended.  REPRODUCTIVE ORGANS: Enlarged post gravid uterus. Low transverse    section scar. No air in the endometrial cavity. Adnexa are unremarkable.    BOWEL: No bowel obstruction. Appendix is normal.  PERITONEUM: Small volume of abdominal and pelvic ascites. No   pneumoperitoneum. No loculated collection tosuggest abscess. No evidence   of a bladder flap hematoma.  VESSELS: Within normal limits.  RETROPERITONEUM/LYMPH NODES: No lymphadenopathy. Multiple subcentimeter   short axis bilateral inguinal lymph nodes, likely reactive.  ABDOMINAL WALL: Postsurgical changes and subcutaneous soft tissue edema.   No rim-enhancing fluid collection within the ventral abdominal wall.  BONES: Mild degenerative changes of the spine.    IMPRESSION:  Enlarged post gravid uterus. Low transverse  section scar.No air   in the endometrial cavity. No pelvic collection.    Small volume of abdominal and pelvic ascites and small bilateral pleural   effusions with patchy lower lung airspace opacities which may represent   atelectasis or pneumonia.            ---End of Report ---          ARTHUR EDMOND MD; Resident Radiology  This document has been electronically signed.  GERTRUDE CLIFFORD DO; Attending Radiologist  This document has been electronically signed. 2022  9:41PM    < end of copied text >

## 2022-06-24 LAB
CULTURE RESULTS: SIGNIFICANT CHANGE UP
GIANT PLATELETS BLD QL SMEAR: PRESENT — SIGNIFICANT CHANGE UP
MANUAL SMEAR VERIFICATION: SIGNIFICANT CHANGE UP
PLATELET COUNT - ESTIMATE: ABNORMAL
SPECIMEN SOURCE: SIGNIFICANT CHANGE UP

## 2022-06-28 LAB
CULTURE RESULTS: SIGNIFICANT CHANGE UP
CULTURE RESULTS: SIGNIFICANT CHANGE UP
SPECIMEN SOURCE: SIGNIFICANT CHANGE UP
SPECIMEN SOURCE: SIGNIFICANT CHANGE UP

## 2022-07-04 LAB — SURGICAL PATHOLOGY STUDY: SIGNIFICANT CHANGE UP

## 2022-07-06 ENCOUNTER — APPOINTMENT (OUTPATIENT)
Dept: WOUND CARE | Facility: CLINIC | Age: 42
End: 2022-07-06

## 2022-07-06 VITALS
WEIGHT: 200 LBS | SYSTOLIC BLOOD PRESSURE: 149 MMHG | BODY MASS INDEX: 35.44 KG/M2 | DIASTOLIC BLOOD PRESSURE: 94 MMHG | RESPIRATION RATE: 14 BRPM | HEART RATE: 90 BPM | TEMPERATURE: 97.6 F | HEIGHT: 63 IN

## 2022-07-06 PROCEDURE — 0598T R-T FLUOR WOUND IMG 1ST SITE: CPT

## 2022-07-06 PROCEDURE — 99204 OFFICE O/P NEW MOD 45 MIN: CPT

## 2022-07-06 RX ORDER — LABETALOL HYDROCHLORIDE 200 MG/1
200 TABLET, FILM COATED ORAL
Qty: 60 | Refills: 0 | Status: ACTIVE | COMMUNITY
Start: 2022-05-18

## 2022-07-06 RX ORDER — ASPIRIN 81 MG/1
81 TABLET, CHEWABLE ORAL
Qty: 30 | Refills: 0 | Status: ACTIVE | COMMUNITY
Start: 2022-02-16

## 2022-07-06 RX ORDER — BLOOD-GLUCOSE METER
W/DEVICE EACH MISCELLANEOUS
Qty: 1 | Refills: 0 | Status: ACTIVE | COMMUNITY
Start: 2022-05-11

## 2022-07-08 RX ORDER — 70%ISOPROPYL ALCOHOL 0.7 ML/ML
70 SWAB TOPICAL
Qty: 100 | Refills: 0 | Status: ACTIVE | COMMUNITY
Start: 2022-07-03

## 2022-07-09 NOTE — REASON FOR VISIT
[Post Hospitalization] : a post hospitalization visit [FreeTextEntry1] : surgical dehiss post c section

## 2022-07-09 NOTE — PLAN
[FreeTextEntry1] : plan \par patient to shower \par scrub area and wound with soap and water \par packing open wd site with aquacel dry dressing daily \par f/u telehealth 1 week in person 3 weeks

## 2022-07-09 NOTE — DATA REVIEWED
[FreeTextEntry1] : Female  post c section 35 week gestation, dehiss w eeks prior  completed po antibiotics for 7 days.  Two open sites along incsion line suprapubic .\par Patient has been treated / wound packed by OBGYN every 3 days plain packing / dry dressing

## 2022-07-09 NOTE — REVIEW OF SYSTEMS
[Fever] : fever [Chills] : chills [Vaginal Discharge] : vaginal discharge [Limb Swelling] : limb swelling [Skin Wound] : skin wound [Negative] : Respiratory [Sore Throat] : no sore throat [Heart Rate Is Slow] : the heart rate was not slow [Heart Rate Is Fast] : the heart rate was not fast [Chest Pain] : no chest pain [Palpitations] : no palpitations [Shortness Of Breath] : no shortness of breath [Wheezing] : no wheezing [Cough] : no cough [SOB on Exertion] : no shortness of breath during exertion [Abdominal Pain] : no abdominal pain [Vomiting] : no vomiting [Constipation] : no constipation [Diarrhea] : no diarrhea [Dysuria] : no dysuria [Limb Pain] : no limb pain [Confused] : no confusion [Dizziness] : no dizziness [Fainting] : no fainting [Limb Weakness] : no limb weakness [Difficulty Walking] : no difficulty walking [Muscle Weakness] : no muscle weakness [Feelings Of Weakness] : no feelings of weakness [Easy Bleeding] : no tendency for easy bleeding [Easy Bruising] : no tendency for easy bruising [FreeTextEntry2] : Subjective fevers at night. Patient did not record temperature [FreeTextEntry3] : Denies blurry vision or changes in vision. [FreeTextEntry5] : hx HTN 5 years  [FreeTextEntry8] : Admits to polyuria and urinary frequency. Vaginal discharge is clear [de-identified] : post c section 2 open sites  6/6/22

## 2022-07-09 NOTE — ASSESSMENT
[FreeTextEntry1] : female 41 years old first baby 35 weeks gestation   hx of 6 miscarriages  2022. 22 dehiscence of incision site 2 weeks ago  patient admitted x 1 day for wound care placed on IV antibiotics discharged on oral  Augmentin, completed course.\par pt has been going to Md office q 3 days for packing of 2 open sites along incision line -\par The patient was positioned as follows .  The fluorescence imaging device was positioned 8-12 cm from the patient and the clinical darkness required for imaging was obtained.  The wound measured by [employer]..  The [employer] i:X fluorescence imaging device was used to report presence and location of red or cyan fluorescence, indicative of bacteria at loads greater than 104 CFU/g in real-time.  Images reported to be positive.  Due to presence of infection causing bacteria the wound was cleansed.  Fluorescence images acquired after cleansing reported no reduction in bacteria..  I then turned my attention to mechanical debridement, resulting in decrease in the fluorescence image.\par \par plan \par patient to shower \par scrub area and wound with soap and water \par packing open wd site with aquacel dry dressing daily \par f/u telehealth 1 week in person 3 weeks

## 2022-07-13 ENCOUNTER — APPOINTMENT (OUTPATIENT)
Dept: WOUND CARE | Facility: CLINIC | Age: 42
End: 2022-07-13

## 2022-07-13 PROCEDURE — 99214 OFFICE O/P EST MOD 30 MIN: CPT | Mod: 95

## 2022-07-20 ENCOUNTER — APPOINTMENT (OUTPATIENT)
Dept: WOUND CARE | Facility: CLINIC | Age: 42
End: 2022-07-20

## 2022-07-27 ENCOUNTER — APPOINTMENT (OUTPATIENT)
Dept: WOUND CARE | Facility: CLINIC | Age: 42
End: 2022-07-27

## 2022-07-27 VITALS — TEMPERATURE: 97.2 F

## 2022-07-27 PROCEDURE — 99213 OFFICE O/P EST LOW 20 MIN: CPT

## 2022-07-27 NOTE — PHYSICAL EXAM
[JVD] : no jugular venous distention  [Skin Ulcer] : ulcer [Alert] : alert [Oriented to Person] : oriented to person [Oriented to Place] : oriented to place [Oriented to Time] : oriented to time [Calm] : calm [de-identified] : NAD, ambulatory [de-identified] : AT [de-identified] : supple [Please See PDF for [employer]] : Please See PDF for [employer].

## 2022-07-27 NOTE — HISTORY OF PRESENT ILLNESS
[FreeTextEntry1] : 41 years old woman post partum 35 weeks gestation   hx of 6 miscarriages  2022. 22 dehiscence of incision site 2 weeks ago  patient admitted x 1 day for wound care placed on IV antibiotics discharged on oral  Augmentin, completed course.\par \par pt has been going to Md office q 3 days for packing of 2 open sites along incision line -\par The patient was positioned as follows .  The fluorescence imaging device was positioned 8-12 cm from the patient and the clinical darkness required for imaging was obtained.  The wound measured by Tissue [employer]..  The [employer] i:X fluorescence imaging device was used to report presence and location of red or cyan fluorescence, indicative of bacteria at loads greater than 104 CFU/g in real-time.  Images reported to be positive.  Due to presence of infection causing bacteria the wound was cleansed.  Fluorescence images acquired after cleansing reported no reduction in bacteria..  I then turned my attention to mechanical debridement, resulting in decrease in the fluorescence image.

## 2022-07-27 NOTE — REVIEW OF SYSTEMS
[Fever] : fever [Chills] : chills [Vaginal Discharge] : vaginal discharge [Limb Swelling] : limb swelling [Skin Wound] : skin wound [Negative] : Respiratory [Sore Throat] : no sore throat [Heart Rate Is Slow] : the heart rate was not slow [Heart Rate Is Fast] : the heart rate was not fast [Chest Pain] : no chest pain [Palpitations] : no palpitations [Shortness Of Breath] : no shortness of breath [Wheezing] : no wheezing [Cough] : no cough [SOB on Exertion] : no shortness of breath during exertion [Abdominal Pain] : no abdominal pain [Vomiting] : no vomiting [Constipation] : no constipation [Diarrhea] : no diarrhea [Dysuria] : no dysuria [Limb Pain] : no limb pain [Confused] : no confusion [Dizziness] : no dizziness [Fainting] : no fainting [Limb Weakness] : no limb weakness [Difficulty Walking] : no difficulty walking [Muscle Weakness] : no muscle weakness [Feelings Of Weakness] : no feelings of weakness [Easy Bleeding] : no tendency for easy bleeding [Easy Bruising] : no tendency for easy bruising [FreeTextEntry2] : Subjective fevers at night. Patient did not record temperature [FreeTextEntry3] : Denies blurry vision or changes in vision. [FreeTextEntry5] : hx HTN 5 years  [FreeTextEntry8] : Admits to polyuria and urinary frequency. Vaginal discharge is clear [de-identified] : post c section 2 open sites  6/6/22

## 2022-07-27 NOTE — ASSESSMENT
[FreeTextEntry1] : female 41 years old first baby 35 weeks gestation   hx of 6 miscarriages  2022. 22 dehiscence of incision site 2 weeks ago  patient admitted x 1 day for wound care placed on IV antibiotics discharged on oral  Augmentin, completed course.\par pt has been going to Md office q 3 days for packing of 2 open sites along incision line -\par The patient was positioned as follows .  The fluorescence imaging device was positioned 8-12 cm from the patient and the clinical darkness required for imaging was obtained.  The wound measured by Tissue [employer]..  The [employer] i:X fluorescence imaging device was used to report presence and location of red or cyan fluorescence, indicative of bacteria at loads greater than 104 CFU/g in real-time.  Images reported to be positive.  Due to presence of infection causing bacteria the wound was cleansed.  Fluorescence images acquired after cleansing reported no reduction in bacteria..  I then turned my attention to mechanical debridement, resulting in decrease in the fluorescence image.\par \par 22\par 2 open areas on transverse lower abdomen, clean and pink, smaller, has been using aquacel\par No clinical sign of infection

## 2022-07-29 NOTE — HISTORY OF PRESENT ILLNESS
[Home] : at home, [unfilled] , at the time of the visit. [Medical Office: ([facility] [place])___] : at the medical office located in  [Verbal consent obtained from patient] : the patient, [unfilled] [FreeTextEntry4] : Bowen NP

## 2022-07-29 NOTE — REVIEW OF SYSTEMS
[Fever] : fever [Chills] : chills [Vaginal Discharge] : vaginal discharge [Limb Swelling] : limb swelling [Skin Wound] : skin wound [Negative] : Respiratory [Sore Throat] : no sore throat [Heart Rate Is Slow] : the heart rate was not slow [Heart Rate Is Fast] : the heart rate was not fast [Chest Pain] : no chest pain [Palpitations] : no palpitations [Shortness Of Breath] : no shortness of breath [Wheezing] : no wheezing [Cough] : no cough [SOB on Exertion] : no shortness of breath during exertion [Abdominal Pain] : no abdominal pain [Vomiting] : no vomiting [Constipation] : no constipation [Diarrhea] : no diarrhea [Dysuria] : no dysuria [Limb Pain] : no limb pain [Confused] : no confusion [Dizziness] : no dizziness [Fainting] : no fainting [Limb Weakness] : no limb weakness [Difficulty Walking] : no difficulty walking [Muscle Weakness] : no muscle weakness [Feelings Of Weakness] : no feelings of weakness [Easy Bleeding] : no tendency for easy bleeding [Easy Bruising] : no tendency for easy bruising [FreeTextEntry2] : Subjective fevers at night. Patient did not record temperature [FreeTextEntry3] : Denies blurry vision or changes in vision. [FreeTextEntry5] : hx HTN 5 years  [FreeTextEntry8] : Admits to polyuria and urinary frequency. Vaginal discharge is clear [de-identified] : post c section 2 open sites  6/6/22

## 2022-07-29 NOTE — ASSESSMENT
[FreeTextEntry1] : female 41 years old first baby 35 weeks gestation   hx of 6 miscarriages  2022. 22 dehiscence of incision site 2 weeks ago  patient admitted x 1 day for wound care placed on IV antibiotics discharged on oral  Augmentin, completed course.\par pt has been going to Md office q 3 days for packing of 2 open sites along incision line -\par The patient was positioned as follows .  The fluorescence imaging device was positioned 8-12 cm from the patient and the clinical darkness required for imaging was obtained.  The wound measured by [employer]..  The Ancera i:X fluorescence imaging device was used to report presence and location of red or cyan fluorescence, indicative of bacteria at loads greater than 104 CFU/g in real-time.  Images reported to be positive.  Due to presence of infection causing bacteria the wound was cleansed.  Fluorescence images acquired after cleansing reported no reduction in bacteria..  I then turned my attention to mechanical debridement, resulting in decrease in the fluorescence image.\par \par 22\par transverse "bikini" line wound is clean and pink, using aquacel

## 2022-07-29 NOTE — PLAN
[FreeTextEntry1] : 7/13/22\par Plan - c/w showering wound, cotton against skin\par aquacel/4x4 over\par follow up 2 weeks

## 2022-08-10 ENCOUNTER — APPOINTMENT (OUTPATIENT)
Dept: WOUND CARE | Facility: CLINIC | Age: 42
End: 2022-08-10

## 2022-08-10 DIAGNOSIS — T81.31XA DISRUPTION OF EXTERNAL OPERATION (SURGICAL) WOUND, NOT ELSEWHERE CLASSIFIED, INITIAL ENCOUNTER: ICD-10-CM

## 2022-08-10 PROCEDURE — 99214 OFFICE O/P EST MOD 30 MIN: CPT | Mod: 95

## 2022-08-11 PROBLEM — T81.31XA POSTOPERATIVE WOUND DEHISCENCE, INITIAL ENCOUNTER: Status: ACTIVE | Noted: 2022-07-09

## 2022-08-11 NOTE — ASSESSMENT
[FreeTextEntry1] : female 41 years old first baby 35 weeks gestation   hx of 6 miscarriages  2022. 22 dehiscence of incision site 2 weeks ago  patient admitted x 1 day for wound care placed on IV antibiotics discharged on oral  Augmentin, completed course.\par pt has been going to Md office q 3 days for packing of 2 open sites along incision line -\par The patient was positioned as follows .  The fluorescence imaging device was positioned 8-12 cm from the patient and the clinical darkness required for imaging was obtained.  The wound measured by [employer]..  The [employer] i:X fluorescence imaging device was used to report presence and location of red or cyan fluorescence, indicative of bacteria at loads greater than 104 CFU/g in real-time.  Images reported to be positive.  Due to presence of infection causing bacteria the wound was cleansed.  Fluorescence images acquired after cleansing reported no reduction in bacteria..  I then turned my attention to mechanical debridement, resulting in decrease in the fluorescence image.\par \par 22\par 2 open areas on transverse lower abdomen, clean and pink, smaller, has been using aquacel\par No clinical sign of infection\par \par 8/10/22\par lower abdominal transverse wound remains open, clean and pink, showering, using aquacel

## 2022-08-11 NOTE — PLAN
[FreeTextEntry1] : 8/10//22\par Plan - shower, dry thoroughly, aquacel/4x4 daily\par cotton garments to area\par follow up TEB

## 2022-08-11 NOTE — REVIEW OF SYSTEMS
[Fever] : fever [Chills] : chills [Vaginal Discharge] : vaginal discharge [Limb Swelling] : limb swelling [Skin Wound] : skin wound [Negative] : Respiratory [Sore Throat] : no sore throat [Heart Rate Is Slow] : the heart rate was not slow [Heart Rate Is Fast] : the heart rate was not fast [Chest Pain] : no chest pain [Palpitations] : no palpitations [Shortness Of Breath] : no shortness of breath [Wheezing] : no wheezing [Cough] : no cough [SOB on Exertion] : no shortness of breath during exertion [Abdominal Pain] : no abdominal pain [Vomiting] : no vomiting [Constipation] : no constipation [Diarrhea] : no diarrhea [Dysuria] : no dysuria [Limb Pain] : no limb pain [Confused] : no confusion [Dizziness] : no dizziness [Fainting] : no fainting [Limb Weakness] : no limb weakness [Difficulty Walking] : no difficulty walking [Muscle Weakness] : no muscle weakness [Feelings Of Weakness] : no feelings of weakness [Easy Bleeding] : no tendency for easy bleeding [Easy Bruising] : no tendency for easy bruising [FreeTextEntry2] : Subjective fevers at night. Patient did not record temperature [FreeTextEntry3] : Denies blurry vision or changes in vision. [FreeTextEntry5] : hx HTN 5 years  [FreeTextEntry8] : Admits to polyuria and urinary frequency. Vaginal discharge is clear [de-identified] : post c section 2 open sites  6/6/22

## 2022-08-11 NOTE — HISTORY OF PRESENT ILLNESS
[Home] : at home, [unfilled] , at the time of the visit. [Medical Office: ([facility])___] : at the medical office located in  [Verbal consent obtained from patient] : the patient, [unfilled] [FreeTextEntry4] : Bowen NP [FreeTextEntry1] : 41 years old woman post partum 35 weeks gestation   hx of 6 miscarriages  2022. 22 dehiscence of incision site 2 weeks ago  patient admitted x 1 day for wound care placed on IV antibiotics discharged on oral  Augmentin, completed course.\par \par pt has been going to Md office q 3 days for packing of 2 open sites along incision line -\par The patient was positioned as follows .  The fluorescence imaging device was positioned 8-12 cm from the patient and the clinical darkness required for imaging was obtained.  The wound measured by [employer]..  The [employer] i:X fluorescence imaging device was used to report presence and location of red or cyan fluorescence, indicative of bacteria at loads greater than 104 CFU/g in real-time.  Images reported to be positive.  Due to presence of infection causing bacteria the wound was cleansed.  Fluorescence images acquired after cleansing reported no reduction in bacteria..  I then turned my attention to mechanical debridement, resulting in decrease in the fluorescence image.

## 2022-08-11 NOTE — PHYSICAL EXAM
[Skin Ulcer] : ulcer [Alert] : alert [Oriented to Person] : oriented to person [Oriented to Place] : oriented to place [Oriented to Time] : oriented to time [Calm] : calm [Please See PDF for [employer]] : Please See PDF for [employer]. [JVD] : no jugular venous distention  [de-identified] : NAD, ambulatory [de-identified] : AT [de-identified] : supple

## 2022-09-17 ENCOUNTER — INPATIENT (INPATIENT)
Facility: HOSPITAL | Age: 42
LOS: 1 days | Discharge: HOME | End: 2022-09-19
Attending: HOSPITALIST | Admitting: HOSPITALIST

## 2022-09-17 VITALS
DIASTOLIC BLOOD PRESSURE: 75 MMHG | OXYGEN SATURATION: 100 % | RESPIRATION RATE: 18 BRPM | HEART RATE: 77 BPM | TEMPERATURE: 98 F | HEIGHT: 63 IN | SYSTOLIC BLOOD PRESSURE: 129 MMHG

## 2022-09-17 LAB
ALBUMIN SERPL ELPH-MCNC: 4 G/DL — SIGNIFICANT CHANGE UP (ref 3.3–5)
ALP SERPL-CCNC: 90 U/L — SIGNIFICANT CHANGE UP (ref 40–120)
ALT FLD-CCNC: 59 U/L — HIGH (ref 4–33)
ANION GAP SERPL CALC-SCNC: 13 MMOL/L — SIGNIFICANT CHANGE UP (ref 7–14)
ANISOCYTOSIS BLD QL: SLIGHT — SIGNIFICANT CHANGE UP
APPEARANCE UR: CLEAR — SIGNIFICANT CHANGE UP
AST SERPL-CCNC: 76 U/L — HIGH (ref 4–32)
BACTERIA # UR AUTO: NEGATIVE — SIGNIFICANT CHANGE UP
BASE EXCESS BLDV CALC-SCNC: -2.1 MMOL/L — LOW (ref -2–3)
BASOPHILS # BLD AUTO: 0.1 K/UL — SIGNIFICANT CHANGE UP (ref 0–0.2)
BASOPHILS NFR BLD AUTO: 1.8 % — SIGNIFICANT CHANGE UP (ref 0–2)
BILIRUB SERPL-MCNC: <0.2 MG/DL — SIGNIFICANT CHANGE UP (ref 0.2–1.2)
BILIRUB UR-MCNC: NEGATIVE — SIGNIFICANT CHANGE UP
BLOOD GAS VENOUS COMPREHENSIVE RESULT: SIGNIFICANT CHANGE UP
BUN SERPL-MCNC: 8 MG/DL — SIGNIFICANT CHANGE UP (ref 7–23)
CALCIUM SERPL-MCNC: 9.1 MG/DL — SIGNIFICANT CHANGE UP (ref 8.4–10.5)
CHLORIDE BLDV-SCNC: 104 MMOL/L — SIGNIFICANT CHANGE UP (ref 96–108)
CHLORIDE SERPL-SCNC: 104 MMOL/L — SIGNIFICANT CHANGE UP (ref 98–107)
CO2 BLDV-SCNC: 25.1 MMOL/L — SIGNIFICANT CHANGE UP (ref 22–26)
CO2 SERPL-SCNC: 21 MMOL/L — LOW (ref 22–31)
COLOR SPEC: YELLOW — SIGNIFICANT CHANGE UP
CREAT SERPL-MCNC: 0.73 MG/DL — SIGNIFICANT CHANGE UP (ref 0.5–1.3)
DACRYOCYTES BLD QL SMEAR: SLIGHT — SIGNIFICANT CHANGE UP
DIFF PNL FLD: ABNORMAL
EGFR: 106 ML/MIN/1.73M2 — SIGNIFICANT CHANGE UP
EOSINOPHIL # BLD AUTO: 0 K/UL — SIGNIFICANT CHANGE UP (ref 0–0.5)
EOSINOPHIL NFR BLD AUTO: 0 % — SIGNIFICANT CHANGE UP (ref 0–6)
EPI CELLS # UR: 4 /HPF — SIGNIFICANT CHANGE UP (ref 0–5)
GAS PNL BLDV: 136 MMOL/L — SIGNIFICANT CHANGE UP (ref 136–145)
GIANT PLATELETS BLD QL SMEAR: PRESENT — SIGNIFICANT CHANGE UP
GLUCOSE BLDV-MCNC: 85 MG/DL — SIGNIFICANT CHANGE UP (ref 70–99)
GLUCOSE SERPL-MCNC: 122 MG/DL — HIGH (ref 70–99)
GLUCOSE UR QL: NEGATIVE — SIGNIFICANT CHANGE UP
HCO3 BLDV-SCNC: 24 MMOL/L — SIGNIFICANT CHANGE UP (ref 22–29)
HCT VFR BLD CALC: 34 % — LOW (ref 34.5–45)
HCT VFR BLDA CALC: 34 % — LOW (ref 34.5–46.5)
HGB BLD CALC-MCNC: 11.2 G/DL — LOW (ref 11.5–15.5)
HGB BLD-MCNC: 10.7 G/DL — LOW (ref 11.5–15.5)
HYALINE CASTS # UR AUTO: 5 /LPF — SIGNIFICANT CHANGE UP (ref 0–7)
HYPOCHROMIA BLD QL: SLIGHT — SIGNIFICANT CHANGE UP
IANC: 3.61 K/UL — SIGNIFICANT CHANGE UP (ref 1.8–7.4)
KETONES UR-MCNC: ABNORMAL
LACTATE BLDV-MCNC: 1.4 MMOL/L — SIGNIFICANT CHANGE UP (ref 0.5–2)
LEUKOCYTE ESTERASE UR-ACNC: NEGATIVE — SIGNIFICANT CHANGE UP
LYMPHOCYTES # BLD AUTO: 1.7 K/UL — SIGNIFICANT CHANGE UP (ref 1–3.3)
LYMPHOCYTES # BLD AUTO: 31.2 % — SIGNIFICANT CHANGE UP (ref 13–44)
MCHC RBC-ENTMCNC: 23.3 PG — LOW (ref 27–34)
MCHC RBC-ENTMCNC: 31.5 GM/DL — LOW (ref 32–36)
MCV RBC AUTO: 74.1 FL — LOW (ref 80–100)
MICROCYTES BLD QL: SLIGHT — SIGNIFICANT CHANGE UP
MONOCYTES # BLD AUTO: 0.15 K/UL — SIGNIFICANT CHANGE UP (ref 0–0.9)
MONOCYTES NFR BLD AUTO: 2.7 % — SIGNIFICANT CHANGE UP (ref 2–14)
NEUTROPHILS # BLD AUTO: 3.4 K/UL — SIGNIFICANT CHANGE UP (ref 1.8–7.4)
NEUTROPHILS NFR BLD AUTO: 43.7 % — SIGNIFICANT CHANGE UP (ref 43–77)
NEUTS BAND # BLD: 18.8 % — CRITICAL HIGH (ref 0–6)
NITRITE UR-MCNC: NEGATIVE — SIGNIFICANT CHANGE UP
PCO2 BLDV: 44 MMHG — HIGH (ref 39–42)
PH BLDV: 7.34 — SIGNIFICANT CHANGE UP (ref 7.32–7.43)
PH UR: 6.5 — SIGNIFICANT CHANGE UP (ref 5–8)
PLAT MORPH BLD: NORMAL — SIGNIFICANT CHANGE UP
PLATELET # BLD AUTO: 250 K/UL — SIGNIFICANT CHANGE UP (ref 150–400)
PLATELET COUNT - ESTIMATE: NORMAL — SIGNIFICANT CHANGE UP
PO2 BLDV: 39 MMHG — SIGNIFICANT CHANGE UP
POIKILOCYTOSIS BLD QL AUTO: SIGNIFICANT CHANGE UP
POLYCHROMASIA BLD QL SMEAR: SLIGHT — SIGNIFICANT CHANGE UP
POTASSIUM BLDV-SCNC: 3.4 MMOL/L — LOW (ref 3.5–5.1)
POTASSIUM SERPL-MCNC: 3.7 MMOL/L — SIGNIFICANT CHANGE UP (ref 3.5–5.3)
POTASSIUM SERPL-SCNC: 3.7 MMOL/L — SIGNIFICANT CHANGE UP (ref 3.5–5.3)
PROT SERPL-MCNC: 7.2 G/DL — SIGNIFICANT CHANGE UP (ref 6–8.3)
PROT UR-MCNC: ABNORMAL
RBC # BLD: 4.59 M/UL — SIGNIFICANT CHANGE UP (ref 3.8–5.2)
RBC # FLD: 19.1 % — HIGH (ref 10.3–14.5)
RBC BLD AUTO: ABNORMAL
RBC CASTS # UR COMP ASSIST: 4 /HPF — SIGNIFICANT CHANGE UP (ref 0–4)
SAO2 % BLDV: 60.3 % — SIGNIFICANT CHANGE UP
SCHISTOCYTES BLD QL AUTO: SLIGHT — SIGNIFICANT CHANGE UP
SMUDGE CELLS # BLD: PRESENT — SIGNIFICANT CHANGE UP
SODIUM SERPL-SCNC: 138 MMOL/L — SIGNIFICANT CHANGE UP (ref 135–145)
SP GR SPEC: 1.04 — SIGNIFICANT CHANGE UP (ref 1.01–1.05)
TARGETS BLD QL SMEAR: SLIGHT — SIGNIFICANT CHANGE UP
TSH SERPL-MCNC: 0.71 UIU/ML — SIGNIFICANT CHANGE UP (ref 0.27–4.2)
UROBILINOGEN FLD QL: ABNORMAL
VARIANT LYMPHS # BLD: 1.8 % — SIGNIFICANT CHANGE UP (ref 0–6)
WBC # BLD: 5.44 K/UL — SIGNIFICANT CHANGE UP (ref 3.8–10.5)
WBC # FLD AUTO: 5.44 K/UL — SIGNIFICANT CHANGE UP (ref 3.8–10.5)
WBC UR QL: 5 /HPF — SIGNIFICANT CHANGE UP (ref 0–5)

## 2022-09-17 PROCEDURE — G1004: CPT

## 2022-09-17 PROCEDURE — 99285 EMERGENCY DEPT VISIT HI MDM: CPT

## 2022-09-17 PROCEDURE — 74177 CT ABD & PELVIS W/CONTRAST: CPT | Mod: 26,MG

## 2022-09-17 PROCEDURE — 76830 TRANSVAGINAL US NON-OB: CPT | Mod: 26

## 2022-09-17 RX ORDER — VANCOMYCIN HCL 1 G
1000 VIAL (EA) INTRAVENOUS ONCE
Refills: 0 | Status: COMPLETED | OUTPATIENT
Start: 2022-09-17 | End: 2022-09-17

## 2022-09-17 RX ORDER — SODIUM CHLORIDE 9 MG/ML
1000 INJECTION INTRAMUSCULAR; INTRAVENOUS; SUBCUTANEOUS ONCE
Refills: 0 | Status: COMPLETED | OUTPATIENT
Start: 2022-09-17 | End: 2022-09-17

## 2022-09-17 RX ORDER — IBUPROFEN 200 MG
600 TABLET ORAL ONCE
Refills: 0 | Status: COMPLETED | OUTPATIENT
Start: 2022-09-17 | End: 2022-09-17

## 2022-09-17 RX ORDER — PIPERACILLIN AND TAZOBACTAM 4; .5 G/20ML; G/20ML
3.38 INJECTION, POWDER, LYOPHILIZED, FOR SOLUTION INTRAVENOUS ONCE
Refills: 0 | Status: COMPLETED | OUTPATIENT
Start: 2022-09-17 | End: 2022-09-17

## 2022-09-17 RX ADMIN — Medication 600 MILLIGRAM(S): at 18:48

## 2022-09-17 RX ADMIN — SODIUM CHLORIDE 1000 MILLILITER(S): 9 INJECTION INTRAMUSCULAR; INTRAVENOUS; SUBCUTANEOUS at 21:41

## 2022-09-17 RX ADMIN — PIPERACILLIN AND TAZOBACTAM 200 GRAM(S): 4; .5 INJECTION, POWDER, LYOPHILIZED, FOR SOLUTION INTRAVENOUS at 22:56

## 2022-09-17 RX ADMIN — Medication 250 MILLIGRAM(S): at 23:33

## 2022-09-17 RX ADMIN — SODIUM CHLORIDE 1000 MILLILITER(S): 9 INJECTION INTRAMUSCULAR; INTRAVENOUS; SUBCUTANEOUS at 20:08

## 2022-09-17 NOTE — ED PROVIDER NOTE - PROGRESS NOTE DETAILS
NP Bereczky- pt with increased heart rate in 130, labs, and fluid orderd, elevated bands 18.8, sepsis work up ordered with abx, and ct abd r/o but not limited to intraabd abscess NP Yuki- discussed results and findings with pt, explained the need of additional testing, and possible hospital stay, pt agrees with plan, although she worries about  for her 3 month old baby. Currently her friend is taking care the baby. ANJEL Trevizo Addendum------This patient was signed out to me by KASANDRA Mirza; case / test results discussed; pt was pending CT abd/pelvis at time of sign-out.  In the interim, pt objectively noted to be resting comfortably; pt has been clinically stable; no issues thus far.  CT results were received and reviewed with ED attending Dr. De La Cruz who rec'd sign-out on this patient from Dr. Katz.  CT abd/pelvis: "......IMPRESSION: No drainable fluid collection or evidence of acute intra-abdominal process.".  All test results were discussed with pt; concern for bandemia and  with no clear source of infection on test results.  Given pt's hx/o fevers x 4 - 5 days, abdominal pain, and urinary c/o, will treat with IV ceftriaxone for concern of early UTI/?pyelo, but will admit to inpatient medicine service for continued management.  Pt verbalizes agreement with plan; pt follows with PMD Dr. Lakesha Rojas, who is a/w [facility].  Dr. Rojas's service contacted; awaiting call back.  Pt objectively well appearing at present, clinically stable at present.

## 2022-09-17 NOTE — ED ADULT TRIAGE NOTE - CHIEF COMPLAINT QUOTE
Pt AOX4 c/o fever x 3 days; c/o urinary burning; took Tylenol this a.m.; fever returns every night (100.5) no cough

## 2022-09-17 NOTE — ED PROVIDER NOTE - ATTENDING APP SHARED VISIT CONTRIBUTION OF CARE
I performed a history and physical exam of the patient and discussed their management with the advanced care provider. I reviewed the advanced care provider's note and agree with the documented findings and plan of care. My medical decision making and objective findings are found above. DR. DEL RIO, ATTENDING MD-  I personally saw the patient with the PA and performed a substantive portion of the visit including all aspects of the medical decision making.    42 y/o female h/o c/s with c/o dysuria x3 days a/w fever.  Pt appears fatigued, dry mm, mild suprapubic discomfort, no cvat.  Eval for uti, pregnancy, sti.  Obtain ua ucx ucg tvus give ivf bolus reassess.

## 2022-09-17 NOTE — ED PROVIDER NOTE - CLINICAL SUMMARY MEDICAL DECISION MAKING FREE TEXT BOX
This is a 41 yr F, no pmh with c/o fever, max temp 100.6 F and burning urination, frequency for the last 3 days. Reports gave birth 3 month ago  via c section, with s/p sx complication of the healing wound. Reports got healed about 2 weeks ago. Not breast feeding.  ua, culture - r/o uti  upon exam with pelvic tenderness and vaginal discharge- tvus ordered- negative results

## 2022-09-18 ENCOUNTER — TRANSCRIPTION ENCOUNTER (OUTPATIENT)
Age: 42
End: 2022-09-18

## 2022-09-18 DIAGNOSIS — Z29.9 ENCOUNTER FOR PROPHYLACTIC MEASURES, UNSPECIFIED: ICD-10-CM

## 2022-09-18 DIAGNOSIS — A41.9 SEPSIS, UNSPECIFIED ORGANISM: ICD-10-CM

## 2022-09-18 DIAGNOSIS — R21 RASH AND OTHER NONSPECIFIC SKIN ERUPTION: ICD-10-CM

## 2022-09-18 DIAGNOSIS — E86.0 DEHYDRATION: ICD-10-CM

## 2022-09-18 DIAGNOSIS — R03.0 ELEVATED BLOOD-PRESSURE READING, WITHOUT DIAGNOSIS OF HYPERTENSION: ICD-10-CM

## 2022-09-18 DIAGNOSIS — R74.01 ELEVATION OF LEVELS OF LIVER TRANSAMINASE LEVELS: ICD-10-CM

## 2022-09-18 DIAGNOSIS — D50.9 IRON DEFICIENCY ANEMIA, UNSPECIFIED: ICD-10-CM

## 2022-09-18 DIAGNOSIS — Z92.89 PERSONAL HISTORY OF OTHER MEDICAL TREATMENT: Chronic | ICD-10-CM

## 2022-09-18 DIAGNOSIS — Z98.891 HISTORY OF UTERINE SCAR FROM PREVIOUS SURGERY: Chronic | ICD-10-CM

## 2022-09-18 DIAGNOSIS — D72.825 BANDEMIA: ICD-10-CM

## 2022-09-18 LAB
24R-OH-CALCIDIOL SERPL-MCNC: 17 NG/ML — LOW (ref 30–80)
ALBUMIN SERPL ELPH-MCNC: 3.1 G/DL — LOW (ref 3.3–5)
ALP SERPL-CCNC: 75 U/L — SIGNIFICANT CHANGE UP (ref 40–120)
ALT FLD-CCNC: 43 U/L — HIGH (ref 4–33)
ANION GAP SERPL CALC-SCNC: 11 MMOL/L — SIGNIFICANT CHANGE UP (ref 7–14)
ANISOCYTOSIS BLD QL: SLIGHT — SIGNIFICANT CHANGE UP
AST SERPL-CCNC: 48 U/L — HIGH (ref 4–32)
BASOPHILS # BLD AUTO: 0 K/UL — SIGNIFICANT CHANGE UP (ref 0–0.2)
BASOPHILS # BLD AUTO: 0.02 K/UL — SIGNIFICANT CHANGE UP (ref 0–0.2)
BASOPHILS NFR BLD AUTO: 0 % — SIGNIFICANT CHANGE UP (ref 0–2)
BASOPHILS NFR BLD AUTO: 0.4 % — SIGNIFICANT CHANGE UP (ref 0–2)
BILIRUB SERPL-MCNC: <0.2 MG/DL — SIGNIFICANT CHANGE UP (ref 0.2–1.2)
BUN SERPL-MCNC: 5 MG/DL — LOW (ref 7–23)
CALCIUM SERPL-MCNC: 8.2 MG/DL — LOW (ref 8.4–10.5)
CHLORIDE SERPL-SCNC: 106 MMOL/L — SIGNIFICANT CHANGE UP (ref 98–107)
CO2 SERPL-SCNC: 18 MMOL/L — LOW (ref 22–31)
CREAT SERPL-MCNC: 0.73 MG/DL — SIGNIFICANT CHANGE UP (ref 0.5–1.3)
DACRYOCYTES BLD QL SMEAR: SLIGHT — SIGNIFICANT CHANGE UP
EGFR: 106 ML/MIN/1.73M2 — SIGNIFICANT CHANGE UP
EOSINOPHIL # BLD AUTO: 0 K/UL — SIGNIFICANT CHANGE UP (ref 0–0.5)
EOSINOPHIL # BLD AUTO: 0 K/UL — SIGNIFICANT CHANGE UP (ref 0–0.5)
EOSINOPHIL NFR BLD AUTO: 0 % — SIGNIFICANT CHANGE UP (ref 0–6)
EOSINOPHIL NFR BLD AUTO: 0 % — SIGNIFICANT CHANGE UP (ref 0–6)
FERRITIN SERPL-MCNC: 45 NG/ML — SIGNIFICANT CHANGE UP (ref 15–150)
FLUAV AG NPH QL: SIGNIFICANT CHANGE UP
FLUBV AG NPH QL: SIGNIFICANT CHANGE UP
GIANT PLATELETS BLD QL SMEAR: PRESENT — SIGNIFICANT CHANGE UP
GLUCOSE SERPL-MCNC: 110 MG/DL — HIGH (ref 70–99)
HCT VFR BLD CALC: 31.5 % — LOW (ref 34.5–45)
HCT VFR BLD CALC: 35 % — SIGNIFICANT CHANGE UP (ref 34.5–45)
HGB BLD-MCNC: 10.4 G/DL — LOW (ref 11.5–15.5)
HGB BLD-MCNC: 9.5 G/DL — LOW (ref 11.5–15.5)
IANC: 2.36 K/UL — SIGNIFICANT CHANGE UP (ref 1.8–7.4)
IANC: 3.59 K/UL — SIGNIFICANT CHANGE UP (ref 1.8–7.4)
IMM GRANULOCYTES NFR BLD AUTO: 0.4 % — SIGNIFICANT CHANGE UP (ref 0–0.9)
IRON SATN MFR SERPL: 16 UG/DL — LOW (ref 30–160)
IRON SATN MFR SERPL: 6 % — LOW (ref 14–50)
LACTATE SERPL-SCNC: 0.7 MMOL/L — SIGNIFICANT CHANGE UP (ref 0.5–2)
LYMPHOCYTES # BLD AUTO: 1.58 K/UL — SIGNIFICANT CHANGE UP (ref 1–3.3)
LYMPHOCYTES # BLD AUTO: 1.84 K/UL — SIGNIFICANT CHANGE UP (ref 1–3.3)
LYMPHOCYTES # BLD AUTO: 28.9 % — SIGNIFICANT CHANGE UP (ref 13–44)
LYMPHOCYTES # BLD AUTO: 36.8 % — SIGNIFICANT CHANGE UP (ref 13–44)
MAGNESIUM SERPL-MCNC: 1.6 MG/DL — SIGNIFICANT CHANGE UP (ref 1.6–2.6)
MCHC RBC-ENTMCNC: 22.6 PG — LOW (ref 27–34)
MCHC RBC-ENTMCNC: 22.8 PG — LOW (ref 27–34)
MCHC RBC-ENTMCNC: 29.7 GM/DL — LOW (ref 32–36)
MCHC RBC-ENTMCNC: 30.2 GM/DL — LOW (ref 32–36)
MCV RBC AUTO: 75 FL — LOW (ref 80–100)
MCV RBC AUTO: 76.6 FL — LOW (ref 80–100)
METAMYELOCYTES # FLD: 1.8 % — HIGH (ref 0–1)
MICROCYTES BLD QL: SLIGHT — SIGNIFICANT CHANGE UP
MONOCYTES # BLD AUTO: 0.22 K/UL — SIGNIFICANT CHANGE UP (ref 0–0.9)
MONOCYTES # BLD AUTO: 0.26 K/UL — SIGNIFICANT CHANGE UP (ref 0–0.9)
MONOCYTES NFR BLD AUTO: 4.4 % — SIGNIFICANT CHANGE UP (ref 2–14)
MONOCYTES NFR BLD AUTO: 4.8 % — SIGNIFICANT CHANGE UP (ref 2–14)
NEUTROPHILS # BLD AUTO: 2.58 K/UL — SIGNIFICANT CHANGE UP (ref 1.8–7.4)
NEUTROPHILS # BLD AUTO: 3.59 K/UL — SIGNIFICANT CHANGE UP (ref 1.8–7.4)
NEUTROPHILS NFR BLD AUTO: 44.7 % — SIGNIFICANT CHANGE UP (ref 43–77)
NEUTROPHILS NFR BLD AUTO: 65.5 % — SIGNIFICANT CHANGE UP (ref 43–77)
NEUTS BAND # BLD: 7 % — HIGH (ref 0–6)
NRBC # BLD: 0 /100 WBCS — SIGNIFICANT CHANGE UP (ref 0–0)
NRBC # FLD: 0 K/UL — SIGNIFICANT CHANGE UP (ref 0–0)
OVALOCYTES BLD QL SMEAR: SLIGHT — SIGNIFICANT CHANGE UP
PHOSPHATE SERPL-MCNC: 2.6 MG/DL — SIGNIFICANT CHANGE UP (ref 2.5–4.5)
PLAT MORPH BLD: ABNORMAL
PLATELET # BLD AUTO: 210 K/UL — SIGNIFICANT CHANGE UP (ref 150–400)
PLATELET # BLD AUTO: 254 K/UL — SIGNIFICANT CHANGE UP (ref 150–400)
PLATELET COUNT - ESTIMATE: NORMAL — SIGNIFICANT CHANGE UP
POIKILOCYTOSIS BLD QL AUTO: SLIGHT — SIGNIFICANT CHANGE UP
POTASSIUM SERPL-MCNC: 3.4 MMOL/L — LOW (ref 3.5–5.3)
POTASSIUM SERPL-SCNC: 3.4 MMOL/L — LOW (ref 3.5–5.3)
PROT SERPL-MCNC: 6.1 G/DL — SIGNIFICANT CHANGE UP (ref 6–8.3)
RBC # BLD: 4.2 M/UL — SIGNIFICANT CHANGE UP (ref 3.8–5.2)
RBC # BLD: 4.2 M/UL — SIGNIFICANT CHANGE UP (ref 3.8–5.2)
RBC # BLD: 4.57 M/UL — SIGNIFICANT CHANGE UP (ref 3.8–5.2)
RBC # FLD: 19.2 % — HIGH (ref 10.3–14.5)
RBC # FLD: 19.5 % — HIGH (ref 10.3–14.5)
RBC BLD AUTO: ABNORMAL
RETICS #: 37.8 K/UL — SIGNIFICANT CHANGE UP (ref 25–125)
RETICS/RBC NFR: 0.9 % — SIGNIFICANT CHANGE UP (ref 0.5–2.5)
RSV RNA NPH QL NAA+NON-PROBE: SIGNIFICANT CHANGE UP
SARS-COV-2 RNA SPEC QL NAA+PROBE: SIGNIFICANT CHANGE UP
SMUDGE CELLS # BLD: PRESENT — SIGNIFICANT CHANGE UP
SODIUM SERPL-SCNC: 135 MMOL/L — SIGNIFICANT CHANGE UP (ref 135–145)
TIBC SERPL-MCNC: 254 UG/DL — SIGNIFICANT CHANGE UP (ref 220–430)
UIBC SERPL-MCNC: 238 UG/DL — SIGNIFICANT CHANGE UP (ref 110–370)
VARIANT LYMPHS # BLD: 5.3 % — SIGNIFICANT CHANGE UP (ref 0–6)
WBC # BLD: 4.99 K/UL — SIGNIFICANT CHANGE UP (ref 3.8–10.5)
WBC # BLD: 5.47 K/UL — SIGNIFICANT CHANGE UP (ref 3.8–10.5)
WBC # FLD AUTO: 4.99 K/UL — SIGNIFICANT CHANGE UP (ref 3.8–10.5)
WBC # FLD AUTO: 5.47 K/UL — SIGNIFICANT CHANGE UP (ref 3.8–10.5)

## 2022-09-18 PROCEDURE — 99223 1ST HOSP IP/OBS HIGH 75: CPT

## 2022-09-18 PROCEDURE — 12345: CPT | Mod: NC,GC

## 2022-09-18 RX ORDER — LANOLIN ALCOHOL/MO/W.PET/CERES
3 CREAM (GRAM) TOPICAL AT BEDTIME
Refills: 0 | Status: DISCONTINUED | OUTPATIENT
Start: 2022-09-18 | End: 2022-09-19

## 2022-09-18 RX ORDER — SODIUM CHLORIDE 9 MG/ML
1000 INJECTION, SOLUTION INTRAVENOUS
Refills: 0 | Status: DISCONTINUED | OUTPATIENT
Start: 2022-09-18 | End: 2022-09-18

## 2022-09-18 RX ORDER — INFLUENZA VIRUS VACCINE 15; 15; 15; 15 UG/.5ML; UG/.5ML; UG/.5ML; UG/.5ML
0.5 SUSPENSION INTRAMUSCULAR ONCE
Refills: 0 | Status: DISCONTINUED | OUTPATIENT
Start: 2022-09-18 | End: 2022-09-19

## 2022-09-18 RX ORDER — PIPERACILLIN AND TAZOBACTAM 4; .5 G/20ML; G/20ML
3.38 INJECTION, POWDER, LYOPHILIZED, FOR SOLUTION INTRAVENOUS EVERY 8 HOURS
Refills: 0 | Status: DISCONTINUED | OUTPATIENT
Start: 2022-09-18 | End: 2022-09-19

## 2022-09-18 RX ORDER — SODIUM CHLORIDE 9 MG/ML
1000 INJECTION, SOLUTION INTRAVENOUS
Refills: 0 | Status: DISCONTINUED | OUTPATIENT
Start: 2022-09-18 | End: 2022-09-19

## 2022-09-18 RX ORDER — LABETALOL HCL 100 MG
100 TABLET ORAL EVERY 12 HOURS
Refills: 0 | Status: DISCONTINUED | OUTPATIENT
Start: 2022-09-18 | End: 2022-09-19

## 2022-09-18 RX ORDER — SODIUM CHLORIDE 9 MG/ML
1000 INJECTION INTRAMUSCULAR; INTRAVENOUS; SUBCUTANEOUS ONCE
Refills: 0 | Status: COMPLETED | OUTPATIENT
Start: 2022-09-18 | End: 2022-09-18

## 2022-09-18 RX ORDER — LABETALOL HCL 100 MG
1 TABLET ORAL
Qty: 0 | Refills: 0 | DISCHARGE

## 2022-09-18 RX ORDER — ACETAMINOPHEN 500 MG
650 TABLET ORAL EVERY 6 HOURS
Refills: 0 | Status: DISCONTINUED | OUTPATIENT
Start: 2022-09-18 | End: 2022-09-19

## 2022-09-18 RX ORDER — CHOLECALCIFEROL (VITAMIN D3) 125 MCG
2000 CAPSULE ORAL DAILY
Refills: 0 | Status: DISCONTINUED | OUTPATIENT
Start: 2022-09-18 | End: 2022-09-19

## 2022-09-18 RX ORDER — CEFTRIAXONE 500 MG/1
1000 INJECTION, POWDER, FOR SOLUTION INTRAMUSCULAR; INTRAVENOUS ONCE
Refills: 0 | Status: COMPLETED | OUTPATIENT
Start: 2022-09-18 | End: 2022-09-18

## 2022-09-18 RX ORDER — POTASSIUM CHLORIDE 20 MEQ
40 PACKET (EA) ORAL ONCE
Refills: 0 | Status: COMPLETED | OUTPATIENT
Start: 2022-09-18 | End: 2022-09-18

## 2022-09-18 RX ADMIN — Medication 100 MILLIGRAM(S): at 17:43

## 2022-09-18 RX ADMIN — PIPERACILLIN AND TAZOBACTAM 25 GRAM(S): 4; .5 INJECTION, POWDER, LYOPHILIZED, FOR SOLUTION INTRAVENOUS at 17:42

## 2022-09-18 RX ADMIN — Medication 650 MILLIGRAM(S): at 06:46

## 2022-09-18 RX ADMIN — Medication 650 MILLIGRAM(S): at 05:34

## 2022-09-18 RX ADMIN — SODIUM CHLORIDE 100 MILLILITER(S): 9 INJECTION, SOLUTION INTRAVENOUS at 13:01

## 2022-09-18 RX ADMIN — CEFTRIAXONE 100 MILLIGRAM(S): 500 INJECTION, POWDER, FOR SOLUTION INTRAMUSCULAR; INTRAVENOUS at 01:27

## 2022-09-18 RX ADMIN — Medication 1 TABLET(S): at 13:00

## 2022-09-18 RX ADMIN — Medication 40 MILLIEQUIVALENT(S): at 13:00

## 2022-09-18 RX ADMIN — CEFTRIAXONE 1000 MILLIGRAM(S): 500 INJECTION, POWDER, FOR SOLUTION INTRAMUSCULAR; INTRAVENOUS at 02:32

## 2022-09-18 RX ADMIN — PIPERACILLIN AND TAZOBACTAM 25 GRAM(S): 4; .5 INJECTION, POWDER, LYOPHILIZED, FOR SOLUTION INTRAVENOUS at 06:46

## 2022-09-18 RX ADMIN — SODIUM CHLORIDE 1000 MILLILITER(S): 9 INJECTION INTRAMUSCULAR; INTRAVENOUS; SUBCUTANEOUS at 05:34

## 2022-09-18 NOTE — H&P ADULT - NSHPPHYSICALEXAM_GEN_ALL_CORE
Vital Signs Last 24 Hrs  T(C): 39.4 (18 Sep 2022 05:10), Max: 39.4 (18 Sep 2022 05:10)  T(F): 102.9 (18 Sep 2022 05:10), Max: 102.9 (18 Sep 2022 05:10)  HR: 145 (18 Sep 2022 05:10) (77 - 145)  BP: 162/80 (18 Sep 2022 05:10) (129/75 - 162/80)  BP(mean): --  RR: 17 (18 Sep 2022 05:10) (16 - 18)  SpO2: 100% (18 Sep 2022 05:10) (100% - 100%)    Parameters below as of 18 Sep 2022 05:10  Patient On (Oxygen Delivery Method): room air    ===============================================================  PHYSICAL EXAMINATION:    APPEARANCE: Adequately groomed, adequately nourished female, lying in bed under several blankets, shivering, but in NAD	  HEENT: Very dry oral mucosa, PERRL, EOMI	  LYMPHATIC: No lymphadenopathy appreciated  CARDIOVASCULAR: (+) S1 S2.  No JVD.  No murmurs.  No edema  RESPIRATORY: No wheezing, rhonchi, crackles appreciated  GASTROINTESTINAL:  Soft, Non-tender, (+) BS  GENITOURINARY: No suprapubic tenderness.    EXTREMITIES: Normal range of motion.  No clubbing, cyanosis or edema  MUSCULOSKELETAL: No atrophy.  No asymmetry.  Good ROM  SKIN: Small erythematous, non-blanchable rashes over multiple areas of the skin; feels slightly raised at some sites.   section wound intact and appears well healed; non-tender to mild-moderate palpation, non-erythematous.  No ecchymoses.  No cyanosis  PSYCHIATRIC: A&O x 3.  Mood & affect appropriate to situation  NEUROLOGICAL: Non-focal, ARDON x 4 against gravity  VASCULAR: Peripheral pulses palpable

## 2022-09-18 NOTE — H&P ADULT - ASSESSMENT
[ x ]  Lab studies reviewed  [ x ]  Radiology reviewed  [ x ]  Old records reviewed    41 year old female, with past history of hypertension, and post partum 2022 via  section, presented to the ED secondary to fevers and burning w/ micturition.  Diagnosed with Bandemia in the ED.

## 2022-09-18 NOTE — H&P ADULT - NSICDXPASTSURGICALHX_GEN_ALL_CORE_FT
PAST SURGICAL HISTORY:  H/O  section     History of ovarian cystectomy     History of transfusion ~ 2022 at the time of giving birth via  section

## 2022-09-18 NOTE — H&P ADULT - NSHPLABSRESULTS_GEN_ALL_CORE
LAB-WORK/STUDIES:                          10.7   5.44  )-----------( 250      ( 17 Sep 2022 20:00 )             34.0     17 Sep 2022 20:00    138    |  104    |  8      ----------------------------<  122    3.7     |  21     |  0.73     Ca    9.1        17 Sep 2022 20:00    TPro  7.2    /  Alb  4.0    /  TBili  <0.2   /  DBili  x      /  AST  76     /  ALT  59     /  AlkPhos  90     17 Sep 2022 20:00    LIVER FUNCTIONS - ( 17 Sep 2022 20:00 )  Alb: 4.0 g/dL / Pro: 7.2 g/dL / ALK PHOS: 90 U/L / ALT: 59 U/L / AST: 76 U/L / GGT: x           CAPILLARY BLOOD GLUCOSE          Urinalysis Basic - ( 17 Sep 2022 17:51 )    Color: Yellow / Appearance: Clear / S.037 / pH: x  Gluc: x / Ketone: Trace  / Bili: Negative / Urobili: 6 mg/dL   Blood: x / Protein: 30 mg/dL / Nitrite: Negative   Leuk Esterase: Negative / RBC: 4 /HPF / WBC 5 /HPF   Sq Epi: x / Non Sq Epi: 4 /HPF / Bacteria: Negative    ========================================================    ECG = sinus tachycardia at 110 bpm, QTc = 436, TWI in III    ========================================================

## 2022-09-18 NOTE — DISCHARGE NOTE PROVIDER - NSDCMRMEDTOKEN_GEN_ALL_CORE_FT
labetalol 200 mg oral tablet: 1 tab(s) orally 2 times a day  Prenatal 1 oral capsule: orally once a day   ciprofloxacin 500 mg oral tablet, extended release: 1 tab(s) orally once a day   labetalol 200 mg oral tablet: 1 tab(s) orally 2 times a day

## 2022-09-18 NOTE — H&P ADULT - PROBLEM SELECTOR PLAN 1
- bandemia of 18.8, tachycardic to 140, febrile to 102.9 in the ED  - has afternoon and nighttime fevers, coinciding w/ burning upon micturition  - no sick contacts or recent travel  - sexually active with one partner, , and lost coitus was ~ one week ago.  Does not use barrier protection  - last menstrual period one year ago;  section deliver in  with well healed ~ 2 weeks ago  - UA negative for overt signs of infection.  COVID-19 PCR/RSV/Flu A & B negative  - CT of A/P negative for acute findings and also TVUS negative for any acute pathology  - given zosyn, vancomycin, ceftriaxone in the ED; continuing w/ zosyn for now  - f/u blood cultures (in progress), urine culture (in progress), Chlamydia & Gonorrhea nucleic acid amplification (ordered)  - ensure optimal hydration; giving additional one liter bolus of NaCl, followed by LR at 100 mL/Hr x 10 hours  - ID consult in the AM (please call)
no preference

## 2022-09-18 NOTE — PROGRESS NOTE ADULT - PROBLEM SELECTOR PLAN 2
- AST/ALT = 76/59  - in the setting of sepsis  - f/u trend; if trending up, would get ultrasound  - would also get coags AST/ALT = 76/59. in the setting of sepsis  - downtrending, now 48/43

## 2022-09-18 NOTE — PROGRESS NOTE ADULT - ASSESSMENT
[ x ]  Lab studies reviewed  [ x ]  Radiology reviewed  [ x ]  Old records reviewed    41 year old female, with past history of hypertension, and post partum 2022 via  section, presented to the ED secondary to fevers and burning w/ micturition.  Diagnosed with Bandemia in the ED. 41 year old female, with past history of hypertension, and post partum 2022 via  section, presented to the ED secondary to fevers and burning w/ micturition.  Diagnosed with Bandemia in the ED.

## 2022-09-18 NOTE — PROGRESS NOTE ADULT - PROBLEM SELECTOR PLAN 3
- very dry oral mucosa and skin  - in the setting of sepsis with decreased oral intake and increased insensible losses  - s/p 2 liters NaCl in the ED.  Additional fluids of LR one liter bolus, followed by LR at 100 mL/Hr x 10 hours ordered  - f/u electrolytes  - evaluate need for additional IVF, and encourage oral hydration, as tolerated - dry oral mucosa and skin  - in the setting of sepsis with decreased oral intake and increased insensible losses  - s/p 2 liters NaCl in the ED.  Additional fluids of LR one liter bolus, followed by LR at 100 mL/Hr x 10 hours ordered  - f/u electrolytes  - evaluate need for additional IVF, and encourage oral hydration, as tolerated - dry oral mucosa and skin  - in the setting of sepsis with decreased oral intake and increased insensible losses  - s/p 2 liters NaCl in the ED.  Additional fluids of LR one liter bolus, followed by LR at 100 mL/Hr x 10 hours ordered  - f/u electrolytes  - evaluate need for additional IVF, and encourage oral hydration, as tolerated  -K+=3.4, repleted - dry oral mucosa and skin  - in the setting of sepsis with decreased oral intake and increased insensible losses  - s/p 2 liters NaCl in the ED.  Additional fluids of LR one liter bolus, followed by LR at 100 mL/Hr until overnight  - f/u electrolytes  - evaluate need for additional IVF, and encourage oral hydration, as tolerated  -K+=3.4, repleted

## 2022-09-18 NOTE — DISCHARGE NOTE PROVIDER - NSDCCPTREATMENT_GEN_ALL_CORE_FT
PRINCIPAL PROCEDURE  Procedure: CT abdomen  Findings and Treatment:   IMPRESSION:  No drainable fluid collection or evidence of acute intra-abdominal   process.        SECONDARY PROCEDURE  Procedure: US transvaginal  Findings and Treatment: IMPRESSION:  Normal pelvic sonogram.

## 2022-09-18 NOTE — H&P ADULT - PROBLEM SELECTOR PLAN 6
- Venodyne boots  - ambulate as tolerated - Hgb = 10.7, MCV = 74.1 (8.1/85 on 06/07/2022)  - transfused p-RBC during June around the time of giving birth  - sample appears hemoconcentrated (patient dehydrated)  - does not report being on iron supplement  - f/u anemia profile in the AM

## 2022-09-18 NOTE — PROGRESS NOTE ADULT - PROBLEM SELECTOR PLAN 6
- Hgb = 10.7, MCV = 74.1 (8.1/85 on 06/07/2022)  - transfused p-RBC during June around the time of giving birth  - sample appears hemoconcentrated (patient dehydrated)  - does not report being on iron supplement  - f/u anemia profile in the AM - Hgb = 10.7, MCV = 74.1 (8.1/85 on 06/07/2022)  - transfused p-RBC during June around the time of giving birth  - sample appears hemoconcentrated (patient dehydrated)  - does not report being on iron supplement  - f/u anemia profile: 6% iron saturation, ferritin=45 - Hgb = 10.7, MCV = 74.1 (8.1/85 on 06/07/2022)  - transfused p-RBC during June around the time of giving birth  - sample appears hemoconcentrated (patient dehydrated)  - f/u anemia profile: 6% iron saturation, ferritin=45  -iron supplement

## 2022-09-18 NOTE — H&P ADULT - HISTORY OF PRESENT ILLNESS
41 year old female, with past history of hypertension, and post partum 2022 via  section, presented to the ED secondary to fevers and burning w/ micturition.  Seen and evaluated at bedside; shivering, but in NAD.  Patient reports onset of fevers approximately 5 days ago; fevers occur in the afternoons and at nights, and burning with micturition occurs during those times as well.  No fever or burning micturition in the AM.  Has significant chills and diaphoresis (has to change nightwear due to profuse sweating).  Has some back pain.  Noticed onset of small, non-pruritic "fine bumps" on the skin yesterday.  No nausea, vomiting.  Last menstrual period was on 2021 (menses have not restarted since giving birth in 2022).  Noticed whitish vaginal discharge since yesterday.  Sexually active with one partner, her , and does not use barrier protection; last coitus was approximately one week ago.  No sick contact or recent travel.    Vital signs upon ED presentation as follows: BP = 129/75, HR = 77 (to max 140), RR = 18, T = 36.9 C to 39.4 C (98.5F to max 102.9 F), O2 Sat = 100% on RA.  Diagnosed with Bandemia and prescribed zosyn, vancomycin, ceftriaxone and 2 liters NaCl in the ED.

## 2022-09-18 NOTE — H&P ADULT - PROBLEM SELECTOR PLAN 3
- very dry oral mucosa and skin  - in the setting of sepsis with decreased oral intake and increased insensible losses  - s/p 2 liters NaCl in the ED.  Additional fluids of LR one liter bolus, followed by LR at 100 mL/Hr x 10 hours ordered  - f/u electrolytes  - evaluate need for additional IVF, and encourage oral hydration, as tolerated

## 2022-09-18 NOTE — H&P ADULT - PROBLEM SELECTOR PLAN 5
- Hgb = 10.7, MCV = 74.1 (8.1/85 on 06/07/2022)  - transfused p-RBC during June around the time of giving birth  - sample appears hemoconcentrated (patient dehydrated)  - does not report being on iron supplement  - f/u anemia profile in the AM - erythematous, some feeling slightly raised, non-pruritic scattered over multiple areas of the body  - onset one day ago  - likely sequela of infection  - f/u cultures  - ID consult in the AM (please call)

## 2022-09-18 NOTE — DISCHARGE NOTE PROVIDER - HOSPITAL COURSE
41 year old female, with past history of hypertension, and post partum 2022 via  section, presented to the ED secondary to fevers, rash, vaginal discharge and burning w/ micturition. Was diagnosed with bandemia in the ED, also found to be dehydrated, anemic, hypokalemic and with transaminitis. Pt was given 3 L NaCl, LR, started on zosyn, vancomycin and cefepime. Potassium was repleted. Given prenatal supplement and started on half dose of home med losartan- 100 mg BID. Pt fever and tachycardia improved, also no longer complained of dysuria and suprapubic tenderness improved. Transaminitis also downtrended. 41 year old female, with past history of hypertension, and post partum 2022 via  section, presented to the ED secondary to fevers, rash, vaginal discharge and burning w/ micturition. Was diagnosed with bandemia in the ED, also found to be dehydrated, anemic, hypokalemic and with transaminitis. Pt was given 3 L NaCl, LR, tylenol. started on zosyn, vancomycin and cefepime. Potassium was repleted. Given prenatal supplement and started on half dose of home med losartan- 100 mg BID. Pt fever and tachycardia improved, also no longer complained of dysuria and suprapubic tenderness improved. Transaminitis and bands also downtrended. 41 year old female, with past history of hypertension, and post partum 2022 via  section, presented to the ED secondary to fevers, rash, vaginal discharge and burning w/ micturition. Was diagnosed with bandemia in the ED, also found to be dehydrated, anemic, hypokalemic and with transaminitis. Pt was given 3 L NaCl, LR, tylenol. started on zosyn, vancomycin and cefepime. Potassium was repleted. CT abdomen and transvaginal ultrasound were negative. Pt fever and tachycardia improved, also no longer complained of dysuria and suprapubic tenderness improved. Transaminitis and bands also downtrended. Urine and blood cultures negative. Treated for presumed UTI. Discharged to complete antibiotics outpatient.

## 2022-09-18 NOTE — H&P ADULT - PROBLEM SELECTOR PLAN 4
- SBP max = 162, DBP max = 92  - in the setting of stress, including shivering, due to sepsis  - takes labetalol 200 mg Q12H; decreasing to 100 mg Q12 for now - especially in the setting of sepsis  - may up-titrate labetalol back to 200 mg Q12H when optimal  - f/u with repeat measurements

## 2022-09-18 NOTE — H&P ADULT - NSHPREVIEWOFSYSTEMS_GEN_ALL_CORE
REVIEW OF SYSTEMS:    CONSTITUTIONAL: Fatigue, recurrent afternoon and nighttime fevers with chills and profuse diaphoresis  EYES/ENT: No visual changes.  No dysphagia  NECK: No pain or stiffness  RESPIRATORY: No cough or hemoptysis.  No shortness of breath  CARDIOVASCULAR: No chest pain or palpitations.  No lower extremity edema  GASTROINTESTINAL: No epigastric or abdominal pain. No nausea, vomiting or hematemesis.  No diarrhea or constipation. No melena or hematochezia.  GENITOURINARY: Burning micturition coinciding w/ fevers (no burning at other times).  Whitish vaginal discharge x one day.  No hematuria  MUSCULOSKELETAL: No joint pain, swelling, decreased ROM, erythema, warmth  NEUROLOGICAL: No numbness or weakness  PSYCHIATRY: No anxiety, or depression.  SKIN: "fine bumps" on multiple areas of the body; not pruritic.  All other review of systems is negative unless indicated above.

## 2022-09-18 NOTE — PROGRESS NOTE ADULT - SUBJECTIVE AND OBJECTIVE BOX
Patient is a 41y old  Female who presents with a chief complaint of Sepsis due to undetermined organism, dehydration (18 Sep 2022 06:05)      INTERVAL HPI/OVERNIGHT EVENTS:      REVIEW OF SYSTEMS:  CONSTITUTIONAL: No fever, weight loss, or fatigue  EYES: No eye pain, visual disturbances, or discharge  ENMT:  No difficulty hearing, tinnitus, vertigo; No sinus or throat pain  NECK: No pain or stiffness  BREASTS: No pain, masses, or nipple discharge  RESPIRATORY: No cough, wheezing, chills or hemoptysis; No shortness of breath  CARDIOVASCULAR: No chest pain, palpitations, dizziness, or leg swelling  GASTROINTESTINAL: No abdominal or epigastric pain. No nausea, vomiting, or hematemesis; No diarrhea or constipation. No melena or hematochezia.  GENITOURINARY: No dysuria, frequency, hematuria, or incontinence  NEUROLOGICAL: No headaches, memory loss, loss of strength, numbness, or tremors  SKIN: No itching, burning, rashes, or lesions   LYMPH NODES: No enlarged glands  ENDOCRINE: No heat or cold intolerance; No hair loss  MUSCULOSKELETAL: No joint pain or swelling; No muscle, back, or extremity pain  PSYCHIATRIC: No depression, anxiety, mood swings, or difficulty sleeping  HEME/LYMPH: No easy bruising, or bleeding gums  ALLERY AND IMMUNOLOGIC: No hives or eczema  FAMILY HISTORY:  No pertinent family history in first degree relatives      T(C): 37.6 (22 @ 06:49), Max: 39.4 (22 @ 05:10)  HR: 120 (22 @ 06:49) (77 - 145)  BP: 127/70 (22 @ 06:49) (127/70 - 162/80)  RR: 18 (22 @ 06:49) (16 - 18)  SpO2: 100% (22 @ 06:49) (100% - 100%)  Wt(kg): --Vital Signs Last 24 Hrs  T(C): 37.6 (18 Sep 2022 06:49), Max: 39.4 (18 Sep 2022 05:10)  T(F): 99.6 (18 Sep 2022 06:49), Max: 102.9 (18 Sep 2022 05:10)  HR: 120 (18 Sep 2022 06:49) (77 - 145)  BP: 127/70 (18 Sep 2022 06:49) (127/70 - 162/80)  BP(mean): --  RR: 18 (18 Sep 2022 06:49) (16 - 18)  SpO2: 100% (18 Sep 2022 06:49) (100% - 100%)    Parameters below as of 18 Sep 2022 06:49  Patient On (Oxygen Delivery Method): room air        PHYSICAL EXAM:  GENERAL: NAD, well-groomed, well-developed  HEAD:  Atraumatic, Normocephalic  EYES: EOMI, PERRLA, conjunctiva and sclera clear  ENMT: No tonsillar erythema, exudates, or enlargement; Moist mucous membranes, Good dentition, No lesions  NECK: Supple, No JVD, Normal thyroid  NERVOUS SYSTEM:  Alert & Oriented X3, Good concentration; Motor Strength 5/5 B/L upper and lower extremities; DTRs 2+ intact and symmetric  CHEST/LUNG: Clear to percussion bilaterally; No rales, rhonchi, wheezing, or rubs  HEART: Regular rate and rhythm; No murmurs, rubs, or gallops  ABDOMEN: Soft, Nontender, Nondistended; Bowel sounds present  EXTREMITIES:  2+ Peripheral Pulses, No clubbing, cyanosis, or edema  LYMPH: No lymphadenopathy noted  SKIN: No rashes or lesions    Consultant(s) Notes Reviewed:  [x ] YES  [ ] NO  Care Discussed with Consultants/Other Providers [ x] YES  [ ] NO    LABS:  CBC Full  -  ( 17 Sep 2022 20:00 )  WBC Count : 5.44 K/uL  RBC Count : 4.59 M/uL  Hemoglobin : 10.7 g/dL  Hematocrit : 34.0 %  Platelet Count - Automated : 250 K/uL  Mean Cell Volume : 74.1 fL  Mean Cell Hemoglobin : 23.3 pg  Mean Cell Hemoglobin Concentration : 31.5 gm/dL  Auto Neutrophil # : 3.40 K/uL  Auto Lymphocyte # : 1.70 K/uL  Auto Monocyte # : 0.15 K/uL  Auto Eosinophil # : 0.00 K/uL  Auto Basophil # : 0.10 K/uL  Auto Neutrophil % : 43.7 %  Auto Lymphocyte % : 31.2 %  Auto Monocyte % : 2.7 %  Auto Eosinophil % : 0.0 %  Auto Basophil % : 1.8 %        138  |  104  |  8   ----------------------------<  122<H>  3.7   |  21<L>  |  0.73    Ca    9.1      17 Sep 2022 20:00    TPro  7.2  /  Alb  4.0  /  TBili  <0.2  /  DBili  x   /  AST  76<H>  /  ALT  59<H>  /  AlkPhos  90  09-17    LIVER FUNCTIONS - ( 17 Sep 2022 20:00 )  Alb: 4.0 g/dL / Pro: 7.2 g/dL / ALK PHOS: 90 U/L / ALT: 59 U/L / AST: 76 U/L / GGT: x           Urinalysis Basic - ( 17 Sep 2022 17:51 )    Color: Yellow / Appearance: Clear / S.037 / pH: x  Gluc: x / Ketone: Trace  / Bili: Negative / Urobili: 6 mg/dL   Blood: x / Protein: 30 mg/dL / Nitrite: Negative   Leuk Esterase: Negative / RBC: 4 /HPF / WBC 5 /HPF   Sq Epi: x / Non Sq Epi: 4 /HPF / Bacteria: Negative              RADIOLOGY & ADDITIONAL TESTS:    Imaging Personally Reviewed:  [ ] YES  [ ] NO  acetaminophen     Tablet .. 650 milliGRAM(s) Oral every 6 hours PRN  labetalol 100 milliGRAM(s) Oral every 12 hours  lactated ringers. 1000 milliLiter(s) IV Continuous <Continuous>  melatonin 3 milliGRAM(s) Oral at bedtime PRN  piperacillin/tazobactam IVPB.. 3.375 Gram(s) IV Intermittent every 8 hours  prenatal multivitamin 1 Tablet(s) Oral daily      HEALTH ISSUES - PROBLEM Dx:  Sepsis due to undetermined organism    Elevated transaminase level    Dehydration    Transient elevated blood pressure    Prophylactic measure    Microcytic anemia    Skin rash           Patient is a 41y old  Female who presents with a chief complaint of Sepsis due to undetermined organism, dehydration (18 Sep 2022 06:05)      INTERVAL HPI/OVERNIGHT EVENTS:  This morning, pt complains of feeling hot (temp was 100.1), mild headache and mild abdominal pain. Denies dysuria currently. Still has mild rash on UE.    REVIEW OF SYSTEMS:  CONSTITUTIONAL: +fever  EYES: No eye pain, visual disturbances, or discharge  ENMT:  No difficulty hearing, tinnitus, vertigo; No sinus or throat pain  RESPIRATORY: No cough, wheezing, or hemoptysis; No shortness of breath  CARDIOVASCULAR: No chest pain, palpitations, dizziness, or leg swelling  GASTROINTESTINAL: + abdominal pain. No nausea, vomiting, or hematemesis; No diarrhea or constipation. No melena or hematochezia.  GENITOURINARY: - No dysuria, frequency, hematuria, or incontinence  NEUROLOGICAL: + headache  SKIN: + UE rash  MUSCULOSKELETAL: No joint pain or swelling; No muscle, back, or extremity pain  PSYCHIATRIC: No depression, anxiety, mood swings, or difficulty sleeping    FAMILY HISTORY:  No pertinent family history in first degree relatives      T(C): 37.6 (22 @ 06:49), Max: 39.4 (22 @ 05:10)  HR: 120 (22 @ 06:49) (77 - 145)  BP: 127/70 (22 @ 06:49) (127/70 - 162/80)  RR: 18 (22 @ 06:49) (16 - 18)  SpO2: 100% (22 @ 06:49) (100% - 100%)  Wt(kg): --Vital Signs Last 24 Hrs  T(C): 37.6 (18 Sep 2022 06:49), Max: 39.4 (18 Sep 2022 05:10)  T(F): 99.6 (18 Sep 2022 06:49), Max: 102.9 (18 Sep 2022 05:10)  HR: 120 (18 Sep 2022 06:49) (77 - 145)  BP: 127/70 (18 Sep 2022 06:49) (127/70 - 162/80)  BP(mean): --  RR: 18 (18 Sep 2022 06:49) (16 - 18)  SpO2: 100% (18 Sep 2022 06:49) (100% - 100%)    Parameters below as of 18 Sep 2022 06:49  Patient On (Oxygen Delivery Method): room air        PHYSICAL EXAM:  GENERAL: NAD, appropriate grooming  HEAD:  Atraumatic, Normocephalic  EYES: EOMI, conjunctiva and sclera clear  ENMT: Dry mucous membranes  NECK: Supple, No JVD  NERVOUS SYSTEM:  Alert & Oriented to situation, Good concentration  CHEST/LUNG: Clear to percussion bilaterally; No rales, rhonchi, wheezing, or rubs  HEART: Tachycardic rate, normal rhythm; No murmurs, rubs, or gallops  ABDOMEN: Suprapubic tenderness, Soft, Nondistended; Bowel sounds present. No CVA tenderness  EXTREMITIES:   No clubbing, cyanosis, or edema  SKIN: UR rash    Consultant(s) Notes Reviewed:  [x ] YES  [ ] NO  Care Discussed with Consultants/Other Providers [ x] YES  [ ] NO    LABS:  CBC Full  -  ( 17 Sep 2022 20:00 )  WBC Count : 5.44 K/uL  RBC Count : 4.59 M/uL  Hemoglobin : 10.7 g/dL  Hematocrit : 34.0 %  Platelet Count - Automated : 250 K/uL  Mean Cell Volume : 74.1 fL  Mean Cell Hemoglobin : 23.3 pg  Mean Cell Hemoglobin Concentration : 31.5 gm/dL  Auto Neutrophil # : 3.40 K/uL  Auto Lymphocyte # : 1.70 K/uL  Auto Monocyte # : 0.15 K/uL  Auto Eosinophil # : 0.00 K/uL  Auto Basophil # : 0.10 K/uL  Auto Neutrophil % : 43.7 %  Auto Lymphocyte % : 31.2 %  Auto Monocyte % : 2.7 %  Auto Eosinophil % : 0.0 %  Auto Basophil % : 1.8 %        138  |  104  |  8   ----------------------------<  122<H>  3.7   |  21<L>  |  0.73    Ca    9.1      17 Sep 2022 20:00    TPro  7.2  /  Alb  4.0  /  TBili  <0.2  /  DBili  x   /  AST  76<H>  /  ALT  59<H>  /  AlkPhos  90      LIVER FUNCTIONS - ( 17 Sep 2022 20:00 )  Alb: 4.0 g/dL / Pro: 7.2 g/dL / ALK PHOS: 90 U/L / ALT: 59 U/L / AST: 76 U/L / GGT: x           Urinalysis Basic - ( 17 Sep 2022 17:51 )    Color: Yellow / Appearance: Clear / S.037 / pH: x  Gluc: x / Ketone: Trace  / Bili: Negative / Urobili: 6 mg/dL   Blood: x / Protein: 30 mg/dL / Nitrite: Negative   Leuk Esterase: Negative / RBC: 4 /HPF / WBC 5 /HPF   Sq Epi: x / Non Sq Epi: 4 /HPF / Bacteria: Negative          RADIOLOGY & ADDITIONAL TESTS:    Imaging Personally Reviewed:  [ ] YES  [ ] NO  acetaminophen     Tablet .. 650 milliGRAM(s) Oral every 6 hours PRN  labetalol 100 milliGRAM(s) Oral every 12 hours  lactated ringers. 1000 milliLiter(s) IV Continuous <Continuous>  melatonin 3 milliGRAM(s) Oral at bedtime PRN  piperacillin/tazobactam IVPB.. 3.375 Gram(s) IV Intermittent every 8 hours  prenatal multivitamin 1 Tablet(s) Oral daily      HEALTH ISSUES - PROBLEM Dx:  Sepsis due to undetermined organism    Elevated transaminase level    Dehydration    Transient elevated blood pressure    Prophylactic measure    Microcytic anemia    Skin rash

## 2022-09-18 NOTE — H&P ADULT - PROBLEM SELECTOR PLAN 2
- AST/ALT = 76/59  - in the setting of sepsis  - f/u trend; if trending up, would get ultrasound  - would also get coags

## 2022-09-18 NOTE — DISCHARGE NOTE PROVIDER - NSDCCPCAREPLAN_GEN_ALL_CORE_FT
PRINCIPAL DISCHARGE DIAGNOSIS  Diagnosis: Fever  Assessment and Plan of Treatment: You came in with fever of 102.9 degrees and suspected infection of unknown source. You were treated with antibiotics, tylenol and given fluids and your symptoms improved and fever resolved.       PRINCIPAL DISCHARGE DIAGNOSIS  Diagnosis: Fever  Assessment and Plan of Treatment: You came in with fever of 102.9 degrees and suspected infection. You were treated with antibiotics, tylenol and given fluids. Your symptoms improved and fever resolved. Your blood cultures and urine culture were negative. You are being treated for a urinary tract infection becuase you presented with burining with urination. Please continue antibiotics as prescribed and follow up with your outpatient doctor.

## 2022-09-18 NOTE — H&P ADULT - NSHPSOCIALHISTORY_GEN_ALL_CORE
SOCIAL HISTORY:    Marital Status:  ( x )    (  ) Single        (  )        (  )        (  )   Lives with:        (  ) Alone       ( x ) Spouse      ( x ) Children/ son        (  ) Parents           (  ) Other  Occupation:     No history of smoking  No history of alcohol abuse  No history of illegal drug use

## 2022-09-18 NOTE — PROGRESS NOTE ADULT - PROBLEM SELECTOR PLAN 1
- bandemia of 18.8, tachycardic to 140, febrile to 102.9 in the ED  - has afternoon and nighttime fevers, coinciding w/ burning upon micturition  - no sick contacts or recent travel  - sexually active with one partner, , and lost coitus was ~ one week ago.  Does not use barrier protection  - last menstrual period one year ago;  section deliver in  with well healed ~ 2 weeks ago  - UA negative for overt signs of infection.  COVID-19 PCR/RSV/Flu A & B negative  - CT of A/P negative for acute findings and also TVUS negative for any acute pathology  - given zosyn, vancomycin, ceftriaxone in the ED; continuing w/ zosyn for now  - f/u blood cultures (in progress), urine culture (in progress), Chlamydia & Gonorrhea nucleic acid amplification (ordered)  - ensure optimal hydration; giving additional one liter bolus of NaCl, followed by LR at 100 mL/Hr x 10 hours  - ID consult in the AM (please call) - bandemia of 18.8, tachycardic to 140, febrile to 102.9 in the ED  - has afternoon and nighttime fevers, coinciding w/ burning upon micturition  - no sick contacts or recent travel  - sexually active with one partner, , and lost coitus was ~ one week ago.  Does not use barrier protection  - last menstrual period one year ago;  section deliver in  with well healed ~ 2 weeks ago  - UA negative for overt signs of infection.  COVID-19 PCR/RSV/Flu A & B negative  - CT of A/P negative for acute findings and also TVUS negative for any acute pathology  - given zosyn, vancomycin, ceftriaxone in the ED; continuing w/ zosyn for now  - f/u blood cultures (in progress), urine culture (in progress), Chlamydia & Gonorrhea nucleic acid amplification (ordered)  - ensure optimal hydration; giving additional one liter bolus of NaCl, followed by LR at 100 mL/Hr x 10 hours  - ID consult - bandemia of 18.8, tachycardic to 140, febrile to 102.9 in the ED  - has afternoon and nighttime fevers, coinciding w/ burning upon micturition  - no sick contacts or recent travel  - sexually active with one partner, , and lost coitus was ~ one week ago.  Does not use barrier protection  - last menstrual period one year ago;  section deliver in  with well healed ~ 2 weeks ago  - UA negative for overt signs of infection.  COVID-19 PCR/RSV/Flu A & B negative  - CT of A/P negative for acute findings and also TVUS negative for any acute pathology  - given zosyn, vancomycin, ceftriaxone in the ED; continuing w/ zosyn for now  - f/u blood cultures (in progress), urine culture (in progress), Chlamydia & Gonorrhea nucleic acid amplification (ordered)  - ensure optimal hydration; giving additional one liter bolus of NaCl, followed by LR at 100 mL/Hr x 10 hours  - consider ID consult - bandemia of 18.8, tachycardic to 140, febrile to 102.9 in the ED  - has afternoon and nighttime fevers, coinciding w/ burning upon micturition  - no sick contacts or recent travel  - sexually active with one partner, , and lost coitus was ~ one week ago.  Does not use barrier protection  - last menstrual period one year ago;  section deliver in  with well healed ~ 2 weeks ago  - UA negative for overt signs of infection.  COVID-19 PCR/RSV/Flu A & B negative  - CT of A/P negative for acute findings and also TVUS negative for any acute pathology  - given zosyn, vancomycin, ceftriaxone in the ED; continuing w/ zosyn for now  - f/u blood cultures (in progress), urine culture (in progress), Chlamydia & Gonorrhea nucleic acid amplification (ordered)  - ensure optimal hydration; giving additional one liter bolus of NaCl, followed by LR at 100 mL/Hr x 10 hours  - consider ID consult, will hold off for now since pt is clinically improving

## 2022-09-18 NOTE — PROGRESS NOTE ADULT - PROBLEM SELECTOR PLAN 5
- erythematous, some feeling slightly raised, non-pruritic scattered over multiple areas of the body  - onset one day ago  - likely sequela of infection  - f/u cultures  - ID consult in the AM (please call) - erythematous, some feeling slightly raised, non-pruritic scattered over multiple areas of the body  - onset one day ago  - likely sequela of infection  - f/u cultures  - ID consult - erythematous, some feeling slightly raised, non-pruritic scattered over multiple areas of the body  - onset one day ago  - likely sequela of infection  - f/u cultures  - consider ID consult

## 2022-09-18 NOTE — DISCHARGE NOTE PROVIDER - CARE PROVIDER_API CALL
Sugar Bates)  Obstetrics and Gynecology  [address] [place] [address]  Phone: [phone]  Fax: [phone]  Established Patient  Follow Up Time: 1 month

## 2022-09-19 ENCOUNTER — TRANSCRIPTION ENCOUNTER (OUTPATIENT)
Age: 42
End: 2022-09-19

## 2022-09-19 VITALS
RESPIRATION RATE: 17 BRPM | DIASTOLIC BLOOD PRESSURE: 77 MMHG | HEART RATE: 83 BPM | SYSTOLIC BLOOD PRESSURE: 127 MMHG | OXYGEN SATURATION: 100 % | TEMPERATURE: 98 F

## 2022-09-19 LAB
ALBUMIN SERPL ELPH-MCNC: 3.2 G/DL — LOW (ref 3.3–5)
ALP SERPL-CCNC: 71 U/L — SIGNIFICANT CHANGE UP (ref 40–120)
ALT FLD-CCNC: 41 U/L — HIGH (ref 4–33)
ANION GAP SERPL CALC-SCNC: 11 MMOL/L — SIGNIFICANT CHANGE UP (ref 7–14)
AST SERPL-CCNC: 45 U/L — HIGH (ref 4–32)
BASOPHILS # BLD AUTO: 0.03 K/UL — SIGNIFICANT CHANGE UP (ref 0–0.2)
BASOPHILS NFR BLD AUTO: 0.6 % — SIGNIFICANT CHANGE UP (ref 0–2)
BILIRUB SERPL-MCNC: <0.2 MG/DL — SIGNIFICANT CHANGE UP (ref 0.2–1.2)
BUN SERPL-MCNC: 4 MG/DL — LOW (ref 7–23)
CALCIUM SERPL-MCNC: 8.5 MG/DL — SIGNIFICANT CHANGE UP (ref 8.4–10.5)
CHLORIDE SERPL-SCNC: 106 MMOL/L — SIGNIFICANT CHANGE UP (ref 98–107)
CO2 SERPL-SCNC: 22 MMOL/L — SIGNIFICANT CHANGE UP (ref 22–31)
CREAT SERPL-MCNC: 0.77 MG/DL — SIGNIFICANT CHANGE UP (ref 0.5–1.3)
CULTURE RESULTS: SIGNIFICANT CHANGE UP
EGFR: 99 ML/MIN/1.73M2 — SIGNIFICANT CHANGE UP
EOSINOPHIL # BLD AUTO: 0.05 K/UL — SIGNIFICANT CHANGE UP (ref 0–0.5)
EOSINOPHIL NFR BLD AUTO: 0.9 % — SIGNIFICANT CHANGE UP (ref 0–6)
GLUCOSE SERPL-MCNC: 98 MG/DL — SIGNIFICANT CHANGE UP (ref 70–99)
HCT VFR BLD CALC: 29.8 % — LOW (ref 34.5–45)
HGB BLD-MCNC: 9.1 G/DL — LOW (ref 11.5–15.5)
IANC: 1.77 K/UL — LOW (ref 1.8–7.4)
IMM GRANULOCYTES NFR BLD AUTO: 0.4 % — SIGNIFICANT CHANGE UP (ref 0–0.9)
LYMPHOCYTES # BLD AUTO: 3.14 K/UL — SIGNIFICANT CHANGE UP (ref 1–3.3)
LYMPHOCYTES # BLD AUTO: 58.3 % — HIGH (ref 13–44)
MCHC RBC-ENTMCNC: 22.5 PG — LOW (ref 27–34)
MCHC RBC-ENTMCNC: 30.5 GM/DL — LOW (ref 32–36)
MCV RBC AUTO: 73.6 FL — LOW (ref 80–100)
MONOCYTES # BLD AUTO: 0.38 K/UL — SIGNIFICANT CHANGE UP (ref 0–0.9)
MONOCYTES NFR BLD AUTO: 7.1 % — SIGNIFICANT CHANGE UP (ref 2–14)
NEUTROPHILS # BLD AUTO: 1.77 K/UL — LOW (ref 1.8–7.4)
NEUTROPHILS NFR BLD AUTO: 32.7 % — LOW (ref 43–77)
NRBC # BLD: 0 /100 WBCS — SIGNIFICANT CHANGE UP (ref 0–0)
NRBC # FLD: 0 K/UL — SIGNIFICANT CHANGE UP (ref 0–0)
PLATELET # BLD AUTO: 268 K/UL — SIGNIFICANT CHANGE UP (ref 150–400)
POTASSIUM SERPL-MCNC: 3.6 MMOL/L — SIGNIFICANT CHANGE UP (ref 3.5–5.3)
POTASSIUM SERPL-SCNC: 3.6 MMOL/L — SIGNIFICANT CHANGE UP (ref 3.5–5.3)
PROT SERPL-MCNC: 6.6 G/DL — SIGNIFICANT CHANGE UP (ref 6–8.3)
RBC # BLD: 4.05 M/UL — SIGNIFICANT CHANGE UP (ref 3.8–5.2)
RBC # FLD: 19.3 % — HIGH (ref 10.3–14.5)
SODIUM SERPL-SCNC: 139 MMOL/L — SIGNIFICANT CHANGE UP (ref 135–145)
SPECIMEN SOURCE: SIGNIFICANT CHANGE UP
WBC # BLD: 5.39 K/UL — SIGNIFICANT CHANGE UP (ref 3.8–10.5)
WBC # FLD AUTO: 5.39 K/UL — SIGNIFICANT CHANGE UP (ref 3.8–10.5)

## 2022-09-19 PROCEDURE — 99239 HOSP IP/OBS DSCHRG MGMT >30: CPT | Mod: GC

## 2022-09-19 RX ORDER — CIPROFLOXACIN LACTATE 400MG/40ML
1 VIAL (ML) INTRAVENOUS
Qty: 5 | Refills: 0
Start: 2022-09-19 | End: 2022-09-23

## 2022-09-19 RX ADMIN — PIPERACILLIN AND TAZOBACTAM 25 GRAM(S): 4; .5 INJECTION, POWDER, LYOPHILIZED, FOR SOLUTION INTRAVENOUS at 09:41

## 2022-09-19 RX ADMIN — Medication 1 TABLET(S): at 11:42

## 2022-09-19 RX ADMIN — Medication 2000 UNIT(S): at 11:41

## 2022-09-19 RX ADMIN — Medication 100 MILLIGRAM(S): at 06:03

## 2022-09-19 RX ADMIN — PIPERACILLIN AND TAZOBACTAM 25 GRAM(S): 4; .5 INJECTION, POWDER, LYOPHILIZED, FOR SOLUTION INTRAVENOUS at 01:54

## 2022-09-19 NOTE — PROGRESS NOTE ADULT - PROBLEM SELECTOR PLAN 6
- Hgb = 10.7, MCV = 74.1 (8.1/85 on 06/07/2022)  - transfused p-RBC during June around the time of giving birth  - sample appears hemoconcentrated (patient dehydrated)  - f/u anemia profile: 6% iron saturation, ferritin=45  -iron supplement - Hgb = 10.7, MCV = 74.1 (8.1/85 on 06/07/2022)  - transfused p-RBC during June around the time of giving birth  - sample appears hemoconcentrated (patient dehydrated)  - f/u anemia profile: 6% iron saturation, ferritin=45  -prenatal supplement - Hgb = 10.7, MCV = 74.1 (8.1/85 on 06/07/2022). Now hgb=9.1 on 9/19  - transfused p-RBC during June around the time of giving birth  - f/u anemia profile: 6% iron saturation, ferritin=45  - continue prenatal supplement

## 2022-09-19 NOTE — PROGRESS NOTE ADULT - PROBLEM SELECTOR PLAN 1
- bandemia of 18.8, tachycardic to 140, febrile to 102.9 in the ED  - has nighttime fevers, coinciding w/ dysuria, no sick contacts or recent travel, sexually active with one partner, , and lost coitus was 1 week ago.  Does not use barrier protection  - LMP 1 year ago;  section deliver in  with well healed ~ 2 weeks ago  - UA negative for overt signs of infection.  COVID-19 PCR/RSV/Flu A & B negative  - CT of A/P negative for acute findings and also TVUS negative for any acute pathology  - given zosyn, vancomycin, ceftriaxone in the ED; continuing w/ zosyn for now  - f/u blood cultures (in progress), urine culture (in progress), Chlamydia & Gonorrhea nucleic acid amplification (ordered)  - ensure optimal hydration; giving additional one liter bolus of NaCl, followed by LR at 100 mL/Hr x 10 hours  - consider ID consult, will hold off for now since pt is clinically improving. - bandemia of 18.8, tachycardic to 140, febrile to 102.9 in the ED  - has nighttime fevers, coinciding w/ dysuria, no sick contacts or recent travel, sexually active with one partner, , and lost coitus was 1 week ago.  Does not use barrier protection  - LMP 1 year ago;  section deliver in  with well healed ~ 2 weeks ago  - UA negative for overt signs of infection.  COVID-19 PCR/RSV/Flu A & B negative  - CT of A/P negative for acute findings and also TVUS negative for any acute pathology  - given zosyn, vancomycin, ceftriaxone in the ED; continuing w/ zosyn for now  - f/u blood cultures (in progress), urine culture (in progress), Chlamydia & Gonorrhea nucleic acid amplification (ordered)  - ensure optimal hydration; giving additional one liter bolus of NaCl, followed by LR at 100 mL/Hr x 10 hours  - consider ID consult, will hold off for now since pt is clinically improving. Pt now afebrile and improving tachycardia. - bandemia of 18.8, tachycardic to 140, febrile to 102.9 in the ED  - has nighttime fevers, coinciding w/ dysuria, no sick contacts or recent travel, sexually active with one partner, , and lost coitus was 1 week ago.  Does not use barrier protection  - LMP 1 year ago;  section deliver in  with well healed ~ 2 weeks ago  - UA negative for overt signs of infection.  COVID-19 PCR/RSV/Flu A & B negative  - CT of A/P negative for acute findings and also TVUS negative for any acute pathology  - given zosyn, vancomycin, ceftriaxone in the ED; continuing w/ zosyn for now  - consider ID consult, will hold off for now since pt is clinically improving. Pt now afebrile and improving tachycardia.  - f/u blood cultures, urine culture, Chlamydia & Gonorrhea nucleic acid amplification

## 2022-09-19 NOTE — PROGRESS NOTE ADULT - PROBLEM SELECTOR PLAN 5
- erythematous, some feeling slightly raised, non-pruritic scattered over multiple areas of the body  - onset one day ago  - likely sequela of infection  - f/u cultures  - consider ID consult - erythematous, some feeling slightly raised, non-pruritic scattered over multiple areas of the body  - onset one day prior to admission  - likely sequela of infection  - f/u cultures - erythematous, some feeling slightly raised, non-pruritic scattered over multiple areas of the body. Improving  - onset one day prior to admission  - likely sequela of infection  - f/u cultures

## 2022-09-19 NOTE — PROGRESS NOTE ADULT - PROBLEM SELECTOR PLAN 2
AST/ALT = 76/59. in the setting of sepsis  - downtrending, now 48/43 AST/ALT = 76/59. in the setting of sepsis  - downtrending, now 41/45

## 2022-09-19 NOTE — PROGRESS NOTE ADULT - ASSESSMENT
41 year old female, with past history of hypertension, and post partum 2022 via  section, presented to the ED secondary to fevers and burning w/ micturition.  Diagnosed with Bandemia in the ED.

## 2022-09-19 NOTE — PROGRESS NOTE ADULT - SUBJECTIVE AND OBJECTIVE BOX
Patient is a 41y old  Female who presents with a chief complaint of Sepsis due to undetermined organism, dehydration (18 Sep 2022 06:05)      INTERVAL HPI/OVERNIGHT EVENTS:  No acute overnight events.     REVIEW OF SYSTEMS:  CONSTITUTIONAL: +fever  EYES: No eye pain, visual disturbances, or discharge  ENMT:  No difficulty hearing, tinnitus, vertigo; No sinus or throat pain  RESPIRATORY: No cough, wheezing, or hemoptysis; No shortness of breath  CARDIOVASCULAR: No chest pain, palpitations, dizziness, or leg swelling  GASTROINTESTINAL: + abdominal pain. No nausea, vomiting, or hematemesis; No diarrhea or constipation. No melena or hematochezia.  GENITOURINARY: - No dysuria, frequency, hematuria, or incontinence  NEUROLOGICAL: + headache  SKIN: + UE rash  MUSCULOSKELETAL: No joint pain or swelling; No muscle, back, or extremity pain  PSYCHIATRIC: No depression, anxiety, mood swings, or difficulty sleeping    FAMILY HISTORY:  No pertinent family history in first degree relatives      T(C): 36.7 (22 @ 06:00), Max: 37.6 (22 @ 21:42)  T(F): 98 (22 @ 06:00), Max: 99.6 (22 @ 21:42)  HR: 92 (22 @ 06:00) (92 - 101)  BP: 116/70 (22 @ 06:00) (116/70 - 144/66)  RR: 17 (22 @ 06:00) (17 - 18)  SpO2: 100% (22 @ 06:00) (99% - 100%)        PHYSICAL EXAM:  GENERAL: NAD, appropriate grooming  HEAD:  Atraumatic, Normocephalic  EYES: EOMI, conjunctiva and sclera clear  ENMT: Dry mucous membranes  NECK: Supple, No JVD  NERVOUS SYSTEM:  Alert & Oriented to situation, Good concentration  CHEST/LUNG: Clear to percussion bilaterally; No rales, rhonchi, wheezing, or rubs  HEART: Tachycardic rate, normal rhythm; No murmurs, rubs, or gallops  ABDOMEN: Suprapubic tenderness, Soft, Nondistended; Bowel sounds present. No CVA tenderness  EXTREMITIES:   No clubbing, cyanosis, or edema  SKIN: UR rash    Consultant(s) Notes Reviewed:  [x ] YES  [ ] NO  Care Discussed with Consultants/Other Providers [ x] YES  [ ] NO    LABS:  CBC Full  -  ( 18 Sep 2022 14:24 )  WBC Count : 4.99 K/uL  RBC Count : 4.57 M/uL  Hemoglobin : 10.4 g/dL  Hematocrit : 35.0 %  Platelet Count - Automated : 254 K/uL  Mean Cell Volume : 76.6 fL  Mean Cell Hemoglobin : 22.8 pg  Mean Cell Hemoglobin Concentration : 29.7 gm/dL  Auto Neutrophil # : 2.58 K/uL  Auto Lymphocyte # : 1.84 K/uL  Auto Monocyte # : 0.22 K/uL  Auto Eosinophil # : 0.00 K/uL  Auto Basophil # : 0.00 K/uL  Auto Neutrophil % : 44.7 %  Auto Lymphocyte % : 36.8 %  Auto Monocyte % : 4.4 %  Auto Eosinophil % : 0.0 %  Auto Basophil % : 0.0 %        135  |  106  |  5<L>  ----------------------------<  110<H>  3.4<L>   |  18<L>  |  0.73    Ca    8.2<L>      18 Sep 2022 06:50  Phos  2.6       Mg     1.60         TPro  6.1  /  Alb  3.1<L>  /  TBili  <0.2  /  DBili  x   /  AST  48<H>  /  ALT  43<H>  /  AlkPhos  75  -18    LIVER FUNCTIONS - ( 18 Sep 2022 06:50 )  Alb: 3.1 g/dL / Pro: 6.1 g/dL / ALK PHOS: 75 U/L / ALT: 43 U/L / AST: 48 U/L / GGT: x           Urinalysis Basic - ( 17 Sep 2022 17:51 )    Color: Yellow / Appearance: Clear / S.037 / pH: x  Gluc: x / Ketone: Trace  / Bili: Negative / Urobili: 6 mg/dL   Blood: x / Protein: 30 mg/dL / Nitrite: Negative   Leuk Esterase: Negative / RBC: 4 /HPF / WBC 5 /HPF   Sq Epi: x / Non Sq Epi: 4 /HPF / Bacteria: Negative      RADIOLOGY & ADDITIONAL TESTS:    Imaging Personally Reviewed:  [ ] YES  [ ] NO  acetaminophen     Tablet .. 650 milliGRAM(s) Oral every 6 hours PRN  labetalol 100 milliGRAM(s) Oral every 12 hours  lactated ringers. 1000 milliLiter(s) IV Continuous <Continuous>  melatonin 3 milliGRAM(s) Oral at bedtime PRN  piperacillin/tazobactam IVPB.. 3.375 Gram(s) IV Intermittent every 8 hours  prenatal multivitamin 1 Tablet(s) Oral daily      HEALTH ISSUES - PROBLEM Dx:  Sepsis due to undetermined organism    Elevated transaminase level    Dehydration    Transient elevated blood pressure    Prophylactic measure    Microcytic anemia    Skin rash           Patient is a 41y old  Female who presents with a chief complaint of Sepsis due to undetermined organism, dehydration (18 Sep 2022 06:05)      INTERVAL HPI/OVERNIGHT EVENTS:  No acute overnight events. This morning, pt complains of mild diaphoresis when she woke up and neck pain from the hospital bed. Otherwise, has no complaints and says she is feeling better. She denies subjective fevers/chills, and says her abdominal pain and dysuria have improved.    REVIEW OF SYSTEMS:  CONSTITUTIONAL: no fever but +diaphoresis. No fatigue  EYES: No eye pain, visual disturbances, or discharge  ENMT:  No difficulty hearing, tinnitus, vertigo; No sinus or throat pain  RESPIRATORY: No cough, wheezing, or hemoptysis; No shortness of breath  CARDIOVASCULAR: No chest pain, palpitations, dizziness, or leg swelling  GASTROINTESTINAL: improving abdominal pain. No nausea, vomiting, or hematemesis; No diarrhea or constipation. No melena or hematochezia.  GENITOURINARY: improving burning with urination. No increased frequency, hematuria, or incontinence  NEUROLOGICAL: no headache, + neck pain  SKIN: + UE rash improving  MUSCULOSKELETAL: No joint pain or swelling; No muscle, back, or extremity pain  PSYCHIATRIC: No depression, anxiety, mood swings, or difficulty sleeping    FAMILY HISTORY:  No pertinent family history in first degree relatives      T(C): 36.7 (22 @ 06:00), Max: 37.6 (22 @ 21:42)  T(F): 98 (22 @ 06:00), Max: 99.6 (22 @ 21:42)  HR: 92 (22 @ 06:00) (92 - 101)  BP: 116/70 (22 @ 06:00) (116/70 - 144/66)  RR: 17 (22 @ 06:00) (17 - 18)  SpO2: 100% (22 @ 06:00) (99% - 100%)        PHYSICAL EXAM:  GENERAL: NAD, appropriate grooming, pleasant, cooperative  HEAD:  Atraumatic, Normocephalic  EYES: EOMI, conjunctiva and sclera clear  ENMT: Moist mucous membranes  NECK: Supple, No JVD  NERVOUS SYSTEM:  Alert & Oriented to situation, Good concentration  CHEST/LUNG: Clear to percussion bilaterally; No rales, rhonchi, wheezing, or rubs  HEART: Tachycardic rate, normal rhythm; No murmurs, rubs, or gallops  ABDOMEN: Suprapubic area less tender Soft, Nondistended; Bowel sounds present  EXTREMITIES:   No clubbing, cyanosis, or edema  SKIN: UR rash improving      Consultant(s) Notes Reviewed:  [x ] YES  [ ] NO  Care Discussed with Consultants/Other Providers [ x] YES  [ ] NO    LABS:  CBC Full  -  ( 19 Sep 2022 06:15 )  WBC Count : 5.39 K/uL  RBC Count : 4.05 M/uL  Hemoglobin : 9.1 g/dL  Hematocrit : 29.8 %  Platelet Count - Automated : 268 K/uL  Mean Cell Volume : 73.6 fL  Mean Cell Hemoglobin : 22.5 pg  Mean Cell Hemoglobin Concentration : 30.5 gm/dL  Auto Neutrophil # : 1.77 K/uL  Auto Lymphocyte # : 3.14 K/uL  Auto Monocyte # : 0.38 K/uL  Auto Eosinophil # : 0.05 K/uL  Auto Basophil # : 0.03 K/uL  Auto Neutrophil % : 32.7 %  Auto Lymphocyte % : 58.3 %  Auto Monocyte % : 7.1 %  Auto Eosinophil % : 0.9 %  Auto Basophil % : 0.6 %        139  |  106  |  4<L>  ----------------------------<  98  3.6   |  22  |  0.77    Ca    8.5      19 Sep 2022 06:15  Phos  2.6     09-18  Mg     1.60     09-18    TPro  6.6  /  Alb  3.2<L>  /  TBili  <0.2  /  DBili  x   /  AST  45<H>  /  ALT  41<H>  /  AlkPhos  71  09-19    LIVER FUNCTIONS - ( 19 Sep 2022 06:15 )  Alb: 3.2 g/dL / Pro: 6.6 g/dL / ALK PHOS: 71 U/L / ALT: 41 U/L / AST: 45 U/L / GGT: x           Urinalysis Basic - ( 17 Sep 2022 17:51 )    Color: Yellow / Appearance: Clear / S.037 / pH: x  Gluc: x / Ketone: Trace  / Bili: Negative / Urobili: 6 mg/dL   Blood: x / Protein: 30 mg/dL / Nitrite: Negative   Leuk Esterase: Negative / RBC: 4 /HPF / WBC 5 /HPF   Sq Epi: x / Non Sq Epi: 4 /HPF / Bacteria: Negative        RADIOLOGY & ADDITIONAL TESTS:    Imaging Personally Reviewed:  [ ] YES  [ ] NO  acetaminophen     Tablet .. 650 milliGRAM(s) Oral every 6 hours PRN  labetalol 100 milliGRAM(s) Oral every 12 hours  lactated ringers. 1000 milliLiter(s) IV Continuous <Continuous>  melatonin 3 milliGRAM(s) Oral at bedtime PRN  piperacillin/tazobactam IVPB.. 3.375 Gram(s) IV Intermittent every 8 hours  prenatal multivitamin 1 Tablet(s) Oral daily      HEALTH ISSUES - PROBLEM Dx:  Sepsis due to undetermined organism    Elevated transaminase level    Dehydration    Transient elevated blood pressure    Prophylactic measure    Microcytic anemia    Skin rash

## 2022-09-19 NOTE — DISCHARGE NOTE NURSING/CASE MANAGEMENT/SOCIAL WORK - PATIENT PORTAL LINK FT
You can access the FollowMyHealth Patient Portal offered by [facility] by registering at the following website: [email or web address]. By joining [facility]’s FollowMyHealth portal, you will also be able to view your health information using other applications (apps) compatible with our system.

## 2022-09-19 NOTE — PROGRESS NOTE ADULT - ATTENDING COMMENTS
41F w/ HTN admitted with dysuria fth severe sepsis with bandemia  -infectious w/u has been negative: CT a/p neg, UA negative, blood cultures negative, UCx negative   -pt has been on Zosyn, and has clinically improved  -will dc home on Cipro x 5 days to treat presumed UTI  -pt has Chlamydia/Gonorrhea tests in lab, will f/u as OP   35 min spent on DC planning
Patient seen and examined by myself , case discussed  with resident ,agree with the above finding and plan  41 year old female, with past history of hypertension, and post partum 2022 via  section, presented to the ED secondary to fevers and burning w/ micturition. , admitted for sepsis   pt clinically better today, fever curve improving , suprapubic tenderness resolved   labs reviewed, will f/u for bandemia, no leucocytosis , lactate wnl  , transaminitis improving   will c/w Abx , f/u cx, f/u GC  will request ID eval ,if patient febrile and worsening clinical status    Rash; tiny bumps in UE and chin, no itching, will CTM, if not resolving, will consider Derm eval   plan of care d/w patient

## 2022-09-19 NOTE — PROGRESS NOTE ADULT - PROBLEM SELECTOR PLAN 4
- SBP max = 162, DBP max = 92  - in the setting of stress, including shivering, due to sepsis  - takes labetalol 200 mg Q12H; decreasing to 100 mg Q12 for now - especially in the setting of sepsis  - may up-titrate labetalol back to 200 mg Q12H when optimal  - f/u with repeat measurements
- SBP max = 162, DBP max = 92  - in the setting of stress, including shivering, due to sepsis  - takes labetalol 200 mg Q12H; decreasing to 100 mg Q12 for now - especially in the setting of sepsis  - may up-titrate labetalol back to 200 mg Q12H when optimal  - f/u with repeat measurements

## 2022-09-19 NOTE — DISCHARGE NOTE NURSING/CASE MANAGEMENT/SOCIAL WORK - NSDCPEFALRISK_GEN_ALL_CORE
For information on Fall & Injury Prevention, visit: [email or web address] OR  [email or web address] OR  https://www.cdc.gov/steadi/patient.html

## 2022-09-19 NOTE — PROGRESS NOTE ADULT - PROBLEM SELECTOR PLAN 3
- dry oral mucosa and skin  - in the setting of sepsis with decreased oral intake and increased insensible losses  - s/p 2 liters NaCl in the ED.  Additional fluids of LR one liter bolus, followed by LR at 100 mL/Hr until overnight  - f/u electrolytes  - evaluate need for additional IVF, and encourage oral hydration, as tolerated  -K+=3.4, repleted - presented with dry oral mucosa and skin, in the setting of sepsis with decreased oral intake and increased insensible losses  - s/p 2 liters NaCl in the ED.  Additional fluids of LR one liter bolus, followed by LR at 100 mL/Hr until overnight  - K+=3.4, repleted  - f/u electrolytes: WNL  - evaluate need for additional IVF, and encourage oral hydration, as tolerated

## 2022-09-20 LAB
C TRACH RRNA SPEC QL NAA+PROBE: SIGNIFICANT CHANGE UP
N GONORRHOEA RRNA SPEC QL NAA+PROBE: SIGNIFICANT CHANGE UP

## 2023-01-16 NOTE — OB RN TRIAGE NOTE - SUICIDE SCREENING DEPRESSION
"[facility] Dermato-allergology Note  Office visit  Encounter Date: Jan 17, 2023  ____________________________________________    CC: Allergy Consult (Pt is here for an allergy consult. Pt has been having \"allergy attacks\" for past year. Starting last spring, has had episodes where eyes swelled, developed hives, felt hot, frequent sneezing. Has happened 4-5 times.)      HPI:  (Jan 17, 2023)  Ms. Nimo Rangel is a(n) 32 year old female who presents today as new patient for allergy consultation  - long history seasonal, cat allergies- Claritin as needed  -1 year ago- allergy attack- sneeze, itchy/swollen eyes, hives, itchy ears, mouth   -takes both benadryl (50 mg), Claritin, Flonase, eye drop    -last about 48 hours   -Takes benadryl for a week after  -5 episodes (March - September)  -No specific trigger identified. Most happened outside (but not all), all times of day.     - otherwise feeling well in usual state of health    Physical exam:  General: In no acute distress, well-developed, well-nourished  Eyes: no conjunctivitis  ENT: no signs of rhinitis   Pulmonary: no wheezing or coughing  Skin: Focused examination of the skin on test sites was performed = see test results below    Past Medical History:   There is no problem list on file for this patient.    No past medical history on file.    Allergies:  No Known Allergies    Medications:  Current Outpatient Medications   Medication     diphenhydrAMINE (BENADRYL) 25 MG tablet     fluticasone (FLONASE) 50 MCG/ACT nasal spray     loratadine (CLARITIN) 10 MG tablet     olopatadine (PATADAY) 0.2 % ophthalmic solution     No current facility-administered medications for this visit.       Social History:  The patient works as a pain fellow. Patient has the following hobbies or non-occupational exposure: outdoor activities, dog    Family History:  No family history on file.    Previous Labs, Allergy Tests, Dermatopathology, " Imaging:  none    Referred By: Referred Self, MD  No address on file     Allergy Tests:    Past Allergy Test    Order for Future Allergy Testing:    [] Outpatient  [] Inpatient: [facility]..../ Bed ....       Skin Atopy (atopic dermatitis) [] Yes   [x] No .........  Contact allergies:   [] Yes   [x] No ..........  Hand eczema:   [] Yes   [x] No           Leading hand:   [] R   [] L       [] Ambidextrous         Drug allergies:        [] Yes   [x] No  which?......    Urticaria/Angioedema  [x] Yes   [] No .........  Food Allergy:  [] Yes   [x] No  which?......  Pets :  [x] Yes   [] No  Which?.Dog.., reactions to cats         [x]  Rhinitis   [x] Conjunctivitis   [] Sinusitis   [] Polyposis   [x] Otitis   [] Pharyngitis         []  Postnasal drip    []  none  Operations:   [] Tonsils   [] Septum   [] Sinus   [] Polyposis        [] Asthma bronchiale   [] Coughing      [x]  none  Symptoms (mostly Rhinoconjunctivitis and Asthma) aggravated by:  Season   [] I   [] II   [x] III   [x] IV   [x]V   [x]VI   [x]VII   [x]VIII   [x]IX   []X   []XI   []XII     [] perennial   Day time      [] morning   [] noon      [] evening        [] night    [x] whole day........  []  none  Location/changes    [x] inside        [x] outside   [] mountains    [] sea     [] others.............   []  none  Triggers, specific     [] animals     [] plants     [] dust              [] others ...........................    []  none  Triggers, others       [] work          [] psyche    [] sport            [] others .............................  []  none  Irritant                [] phys efforts [] smoke    [] heat/cold     [] odors  []others............... []  none    Order for PATCH TESTS  Reason for tests (suspected allergy): not necessary  Known previous allergies: n/a  Standardized panels  [] Standard panel (40 tests)  [] Preservatives & Antimicrobials (31 tests)  [] Emulsifiers & Additives (25 tests)   [] Perfumes/Flavours & Plants (25 tests)  [] Hairdresser  panel (12 tests)  [] Rubber Chemicals (22 tests)  [] Plastics (26 tests)  [] Colorants/Dyes/Food additives (20 tests)  [] Metals (implants/dental) (24 tests)  [] Local anaesthetics/NSAIDs (13 tests)  [] Antibiotics & Antimycotics (14 tests)   [] Corticosteroids (15 tests)   [] Photopatch test (62 tests)   [] others: ...      [] Patient's own products: ...    DO NOT test if chemical or biological identity is unknown!     always ask from patient the product information and safety sheets (MSDS)       Order for PRICK TESTS    Reason for tests (suspected allergy): seasonal RC March to September and cat allergy  Known previous allergies: none    Standardized prick panels  [x] Atopic panel (20 tests)  [] Pediatric Panel (12 tests)  [] Milk, Meat, Eggs, Grains (20 tests)   [] Dust, Epithelia, Feathers (10 tests)  [] Fish, Seafood, Shellfish (17 tests)  [] Nuts, Beans (14 tests)  [] Spice, Vegetable, Fruit (17 tests)  [] Pollen Panel = Tree, Grass, Weed (24 tests)  [] Others: ...      [] Patient's own products: ...    DO NOT test if chemical or biological identity is unknown!     always ask from patient the product information and safety sheets (MSDS)     Standardized intradermal tests  [] Penicillium notatum [] Aspergillus fumigatus [] House dust mites D.far & D. pteron  [] Cat    [] dog  [] Others: ...  [] Bee venom   [] Wasp venom  !!Specific protocol with dilutions!!       Order for Drug allergy tests (prick & Intradermal & patch tests)    [] Penicillin G  [] Ampicillin [] Cefazolin   [] Ceftriaxone   [] Ceftazidime  [] Bactrim    [] Others: ...  Order for ... as test date    Atopy Screen (Placed Jan 17, 2023)  No Substance Readings (15 min) Evaluation   POS Histamine 1mg/ml ++    NEG NaCl 0.9% -      No Substance Readings (15 min) Evaluation   1 Alternaria alternata (tenuis)  -    2 Cladosporium herbarum -    3 Aspergillus fumigatus -    4 Penicillium notatum -    5 Dermatophagoides pteronyssinus +++    6  Dermatophagoides farinae +++    7 Dog epithelium (canis spp) -    8 Cat hair (dona catus) ++    9 Cockroach   (Blatella americana & germanica) +/++    10 Grass mix midwest   (Huma, Orchard, Redtop, Carmelo) -    11 Unruly grass (sorghum halepense) -    12 Weed mix   (common Cocklebur, Lamb s quarters, rough redroot Pigweed, Dock/Sorrel) -    13 Mug wort (artemisia vulgare) -    14 Ragweed giant/short (ambrosia spp) -    15 White birch (Betula papyrifera) -    16 Tree mix 1 (Pecan, Maple BHR, Oak RVW, american Marydel, black [place]) ++    17 Red cedar (juniperus virginia) -    18 Tree mix 2   (white [place], river/red Birch, black [place], common [place], american Elm) -    19 Box elder/Maple mix (acer spp) ++    20 Hickory shagbark (carya ovata) -           Conclusion    ________________________________    Assessment & Plan:    ==> Final Diagnosis:     # atopic predisposition with    Clinically relevant RC to cat allergen (since childhood) = positive in prick tests    RC and otitis with acute flare ups from March to September = maybe dust mites and maple pollens    Very strong sensitization to house dust mites in prick tests = morning sneezing  * chronic illness with exacerbation, progression, side effects from treatment    These conclusions are made at the best of one's knowledge and belief based on the provided evidence such as patient's history and allergy test results and they can change over time or can be incomplete because of missing information's.    ==> Treatment Plan:  >> reduction of HDM (info given)  >> Allegra 180mg at bedtime  >> Nasonex nasal spray at bedtime    Procedures Performed: Allergy tests, including prick tests    Staff and Resident:  Provider    Staff Physician Comments:  I saw and evaluated the patient with the resident and I agree with the assessment and plan as documented in the resident's note.    Matthieu Swan MD  Professor  Head of Dermato-Allergy Division  Department of  Dermatology  [facility]    Follow-up in Derm-Allergy clinic in about 2 months and then pollen panel    I spent a total of 35 minutes with Nimo Rangel. This time was spent counseling the patient and/or coordinating care, explaining the allergy tests, performing allergy tests and assessing the clinical relevance.     Negative

## 2023-06-26 ENCOUNTER — EMERGENCY (EMERGENCY)
Facility: HOSPITAL | Age: 43
LOS: 1 days | Discharge: HOME | End: 2023-06-26
Admitting: STUDENT IN AN ORGANIZED HEALTH CARE EDUCATION/TRAINING PROGRAM
Payer: MEDICAID

## 2023-06-26 VITALS
SYSTOLIC BLOOD PRESSURE: 145 MMHG | DIASTOLIC BLOOD PRESSURE: 80 MMHG | HEART RATE: 84 BPM | OXYGEN SATURATION: 100 % | TEMPERATURE: 98 F | RESPIRATION RATE: 18 BRPM

## 2023-06-26 DIAGNOSIS — Z92.89 PERSONAL HISTORY OF OTHER MEDICAL TREATMENT: Chronic | ICD-10-CM

## 2023-06-26 DIAGNOSIS — Z98.891 HISTORY OF UTERINE SCAR FROM PREVIOUS SURGERY: Chronic | ICD-10-CM

## 2023-06-26 PROBLEM — N96 RECURRENT PREGNANCY LOSS: Chronic | Status: ACTIVE | Noted: 2022-09-18

## 2023-06-26 LAB
ALBUMIN SERPL ELPH-MCNC: 4.1 G/DL — SIGNIFICANT CHANGE UP (ref 3.3–5)
ALP SERPL-CCNC: 78 U/L — SIGNIFICANT CHANGE UP (ref 40–120)
ALT FLD-CCNC: 8 U/L — SIGNIFICANT CHANGE UP (ref 4–33)
ANION GAP SERPL CALC-SCNC: 10 MMOL/L — SIGNIFICANT CHANGE UP (ref 7–14)
APPEARANCE UR: CLEAR — SIGNIFICANT CHANGE UP
AST SERPL-CCNC: 12 U/L — SIGNIFICANT CHANGE UP (ref 4–32)
BACTERIA # UR AUTO: NEGATIVE — SIGNIFICANT CHANGE UP
BASOPHILS # BLD AUTO: 0.01 K/UL — SIGNIFICANT CHANGE UP (ref 0–0.2)
BASOPHILS NFR BLD AUTO: 0.2 % — SIGNIFICANT CHANGE UP (ref 0–2)
BILIRUB SERPL-MCNC: 0.2 MG/DL — SIGNIFICANT CHANGE UP (ref 0.2–1.2)
BILIRUB UR-MCNC: NEGATIVE — SIGNIFICANT CHANGE UP
BLD GP AB SCN SERPL QL: NEGATIVE — SIGNIFICANT CHANGE UP
BUN SERPL-MCNC: 9 MG/DL — SIGNIFICANT CHANGE UP (ref 7–23)
CALCIUM SERPL-MCNC: 9.3 MG/DL — SIGNIFICANT CHANGE UP (ref 8.4–10.5)
CHLORIDE SERPL-SCNC: 104 MMOL/L — SIGNIFICANT CHANGE UP (ref 98–107)
CO2 SERPL-SCNC: 25 MMOL/L — SIGNIFICANT CHANGE UP (ref 22–31)
COLOR SPEC: SIGNIFICANT CHANGE UP
CREAT SERPL-MCNC: 0.74 MG/DL — SIGNIFICANT CHANGE UP (ref 0.5–1.3)
DIFF PNL FLD: ABNORMAL
EGFR: 104 ML/MIN/1.73M2 — SIGNIFICANT CHANGE UP
EOSINOPHIL # BLD AUTO: 0.01 K/UL — SIGNIFICANT CHANGE UP (ref 0–0.5)
EOSINOPHIL NFR BLD AUTO: 0.2 % — SIGNIFICANT CHANGE UP (ref 0–6)
EPI CELLS # UR: 2 /HPF — SIGNIFICANT CHANGE UP (ref 0–5)
GLUCOSE SERPL-MCNC: 109 MG/DL — HIGH (ref 70–99)
GLUCOSE UR QL: NEGATIVE — SIGNIFICANT CHANGE UP
HCG SERPL-ACNC: <1 MIU/ML — SIGNIFICANT CHANGE UP
HCT VFR BLD CALC: 36.7 % — SIGNIFICANT CHANGE UP (ref 34.5–45)
HGB BLD-MCNC: 11.4 G/DL — LOW (ref 11.5–15.5)
HYALINE CASTS # UR AUTO: 1 /LPF — SIGNIFICANT CHANGE UP (ref 0–7)
IANC: 3.12 K/UL — SIGNIFICANT CHANGE UP (ref 1.8–7.4)
IMM GRANULOCYTES NFR BLD AUTO: 0.3 % — SIGNIFICANT CHANGE UP (ref 0–0.9)
KETONES UR-MCNC: NEGATIVE — SIGNIFICANT CHANGE UP
LEUKOCYTE ESTERASE UR-ACNC: NEGATIVE — SIGNIFICANT CHANGE UP
LYMPHOCYTES # BLD AUTO: 2.82 K/UL — SIGNIFICANT CHANGE UP (ref 1–3.3)
LYMPHOCYTES # BLD AUTO: 43.5 % — SIGNIFICANT CHANGE UP (ref 13–44)
MCHC RBC-ENTMCNC: 26.3 PG — LOW (ref 27–34)
MCHC RBC-ENTMCNC: 31.1 GM/DL — LOW (ref 32–36)
MCV RBC AUTO: 84.6 FL — SIGNIFICANT CHANGE UP (ref 80–100)
MONOCYTES # BLD AUTO: 0.5 K/UL — SIGNIFICANT CHANGE UP (ref 0–0.9)
MONOCYTES NFR BLD AUTO: 7.7 % — SIGNIFICANT CHANGE UP (ref 2–14)
NEUTROPHILS # BLD AUTO: 3.12 K/UL — SIGNIFICANT CHANGE UP (ref 1.8–7.4)
NEUTROPHILS NFR BLD AUTO: 48.1 % — SIGNIFICANT CHANGE UP (ref 43–77)
NITRITE UR-MCNC: NEGATIVE — SIGNIFICANT CHANGE UP
NRBC # BLD: 0 /100 WBCS — SIGNIFICANT CHANGE UP (ref 0–0)
NRBC # FLD: 0 K/UL — SIGNIFICANT CHANGE UP (ref 0–0)
PH UR: 6.5 — SIGNIFICANT CHANGE UP (ref 5–8)
PLATELET # BLD AUTO: 378 K/UL — SIGNIFICANT CHANGE UP (ref 150–400)
POTASSIUM SERPL-MCNC: 4 MMOL/L — SIGNIFICANT CHANGE UP (ref 3.5–5.3)
POTASSIUM SERPL-SCNC: 4 MMOL/L — SIGNIFICANT CHANGE UP (ref 3.5–5.3)
PROT SERPL-MCNC: 7.1 G/DL — SIGNIFICANT CHANGE UP (ref 6–8.3)
PROT UR-MCNC: NEGATIVE — SIGNIFICANT CHANGE UP
RBC # BLD: 4.34 M/UL — SIGNIFICANT CHANGE UP (ref 3.8–5.2)
RBC # FLD: 15 % — HIGH (ref 10.3–14.5)
RBC CASTS # UR COMP ASSIST: 14 /HPF — HIGH (ref 0–4)
RH IG SCN BLD-IMP: POSITIVE — SIGNIFICANT CHANGE UP
SODIUM SERPL-SCNC: 139 MMOL/L — SIGNIFICANT CHANGE UP (ref 135–145)
SP GR SPEC: 1.01 — SIGNIFICANT CHANGE UP (ref 1.01–1.05)
UROBILINOGEN FLD QL: SIGNIFICANT CHANGE UP
WBC # BLD: 6.48 K/UL — SIGNIFICANT CHANGE UP (ref 3.8–10.5)
WBC # FLD AUTO: 6.48 K/UL — SIGNIFICANT CHANGE UP (ref 3.8–10.5)
WBC UR QL: 4 /HPF — SIGNIFICANT CHANGE UP (ref 0–5)

## 2023-06-26 PROCEDURE — 99284 EMERGENCY DEPT VISIT MOD MDM: CPT

## 2023-06-26 PROCEDURE — 76830 TRANSVAGINAL US NON-OB: CPT | Mod: 26

## 2023-06-26 RX ORDER — ACETAMINOPHEN 500 MG
975 TABLET ORAL ONCE
Refills: 0 | Status: COMPLETED | OUTPATIENT
Start: 2023-06-26 | End: 2023-06-26

## 2023-06-26 RX ORDER — LIDOCAINE 4 G/100G
1 CREAM TOPICAL ONCE
Refills: 0 | Status: COMPLETED | OUTPATIENT
Start: 2023-06-26 | End: 2023-06-26

## 2023-06-26 RX ADMIN — LIDOCAINE 1 PATCH: 4 CREAM TOPICAL at 16:49

## 2023-06-26 RX ADMIN — Medication 975 MILLIGRAM(S): at 16:49

## 2023-06-26 NOTE — ED ADULT NURSE NOTE - OBJECTIVE STATEMENT
pt aox4, ambulatory, , comes in for concerns of vaginal bleeding and lower back pain- pt endorsing having a positive urine pregnancy last Friday. pt notes, bleeding improved since Friday but still spotting. pt denies fevers or dysuria. no nausea/vomiting but endorses poor appetite. denies chest pain or shortness of breath, breathing even and unlabored. skin color appropriate for skin tone.

## 2023-06-26 NOTE — ED ADULT TRIAGE NOTE - CHIEF COMPLAINT QUOTE
Pt states her LMP was 4/26. pt states she has been having back pain WITH VAGINAL BLEEDING x 3 days. pt states she is pregnant . Pt co lower abdominal pain with back pain x 3 days.

## 2023-06-26 NOTE — ED PROVIDER NOTE - OBJECTIVE STATEMENT
42yoF , currently pregnant, LMP , no confirmed IUP, p/w low back pain, and vaginal bleeding. 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 denies any fevers, numbness, tingling, bowel/bladder incontinence, retention or falls. taking no meds for pain, only warm compresses to the area with some mild relief. no hx of spinal surgeries. Pt of note also developed vaginal bleeding over past 3 days, changing pads approx 2 pads/day, no clots. mild suprapubic pressure. no dysuria, vaginal discharge, irritation, n/v/d/c.

## 2023-06-26 NOTE — ED PROVIDER NOTE - PHYSICAL EXAMINATION
CONSTITUTIONAL: Well-appearing; well-nourished; in no apparent distress;  HEAD: Normocephalic, atraumatic;  NECK/LYMPH: Supple; non-tender;  CARD: Normal S1, S2; no murmurs, rubs, or gallops noted  RESP: Normal chest excursion with respiration; breath sounds clear and equal bilaterally; no wheezes, rhonchi, or rales noted  ABD/GI: soft, non-distended; non-tender; no palpable organomegaly, no pulsatile mass  PELVIC: performed via speculum exam with Female Chaperone ANJEL Levine. no external vulvar lesions/rashes or abnormalities. cervical os closed. mild vaginal bleeding. Bimanual exam: negative CMT, negative adnexal ttp.  EXT/MS: no visible deformity, negative midline lumbar spine, b/l paraspinal ttp. sensation intact. moves all extremities; distal pulses are normal, no pedal edema  SKIN: Normal for age and race; warm; dry; good turgor; no apparent lesions or exudate noted  NEURO: Awake, alert, oriented x 3, no gross deficits, CN II-XII grossly intact, no motor or sensory deficit noted  PSYCH: Normal mood; appropriate affect

## 2023-06-26 NOTE — ED PROVIDER NOTE - PATIENT PORTAL LINK FT
You can access the FollowMyHealth Patient Portal offered by [facility] by registering at the following website: [email or web address]. By joining [facility]’s FollowMyHealth portal, you will also be able to view your health information using other applications (apps) compatible with our system.

## 2023-06-26 NOTE — ED PROVIDER NOTE - CLINICAL SUMMARY MEDICAL DECISION MAKING FREE TEXT BOX
42yoF , currently pregnant, LMP , no confirmed IUP, p/w low back pain, and vaginal bleeding. 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 denies any fevers, numbness, tingling, bowel/bladder incontinence, retention or falls. taking no meds for pain, only warm compresses to the area with some mild relief. no hx of spinal surgeries. Pt of note also developed vaginal bleeding over past 3 days, changing pads approx 2 pads/day, no clots. mild suprapubic pressure. no dysuria, vaginal discharge, irritation, n/v/d/c.    PLAN: suspect MSK strain vs spasm of lower back, will tx w/ tylenol lido patch  will check TVUS, labs, hcg, ua to r/o asymptomatic pyuria in pregnancy. c/f spontaneous , threatened , ectopic

## 2024-01-02 ENCOUNTER — ASOB RESULT (OUTPATIENT)
Age: 44
End: 2024-01-02

## 2024-01-02 ENCOUNTER — APPOINTMENT (OUTPATIENT)
Dept: OBGYN | Facility: CLINIC | Age: 44
End: 2024-01-02
Payer: MEDICAID

## 2024-01-02 VITALS
DIASTOLIC BLOOD PRESSURE: 85 MMHG | SYSTOLIC BLOOD PRESSURE: 153 MMHG | WEIGHT: 187 LBS | HEART RATE: 79 BPM | BODY MASS INDEX: 33.13 KG/M2 | HEIGHT: 63 IN

## 2024-01-02 DIAGNOSIS — I10 ESSENTIAL (PRIMARY) HYPERTENSION: ICD-10-CM

## 2024-01-02 DIAGNOSIS — Z32.01 ENCOUNTER FOR PREGNANCY TEST, RESULT POSITIVE: ICD-10-CM

## 2024-01-02 DIAGNOSIS — O09.521 SUPERVISION OF ELDERLY MULTIGRAVIDA, FIRST TRIMESTER: ICD-10-CM

## 2024-01-02 PROCEDURE — 99203 OFFICE O/P NEW LOW 30 MIN: CPT

## 2024-01-02 PROCEDURE — 76817 TRANSVAGINAL US OBSTETRIC: CPT

## 2024-01-02 NOTE — HISTORY OF PRESENT ILLNESS
[FreeTextEntry1] : 43 y.o. P 0171 presents for confirmation of pregnancy. . pt feels  well.  .  PMH -  HTN - Labetalol 200mg daily, ,  PSHx - endometrial polyps,  s/p Hysteroscopy.  FH - HTN - mother,   SH - negative,  GYN hx - spont ab x 7 s/p D&C  x 3 ,  history of abnormal pap. ,  OB hx - C/S  at 35 weeks.  at [facility]. ( see chart)  J-incision with myomectomy  complicated by IUGR, cHTN and PEC. , cat. 2 tracing  LNMP Nov. 1st or 5th. EGA by LMP is approx 8 weeks  pt denies cramping or bleeding.

## 2024-01-02 NOTE — DISCUSSION/SUMMARY
[FreeTextEntry1] : A - early pregnancy      chronic HTN      recurrent ab      AMA  P- f/u 1 week     reviewed today's sono with pt, indicating a pathologic pregnancy     URine C&S, pap and Gen probe done      will repeat sono in 1 week and if findings are the same, will  pt. accordingly.

## 2024-01-03 LAB
C TRACH RRNA SPEC QL NAA+PROBE: NOT DETECTED
HPV HIGH+LOW RISK DNA PNL CVX: NOT DETECTED
N GONORRHOEA RRNA SPEC QL NAA+PROBE: NOT DETECTED
SOURCE AMPLIFICATION: NORMAL

## 2024-01-04 LAB — BACTERIA UR CULT: NORMAL

## 2024-01-05 LAB — CYTOLOGY CVX/VAG DOC THIN PREP: NORMAL

## 2024-01-06 ENCOUNTER — EMERGENCY (EMERGENCY)
Facility: HOSPITAL | Age: 44
LOS: 1 days | Discharge: HOME | End: 2024-01-06
Attending: EMERGENCY MEDICINE | Admitting: EMERGENCY MEDICINE
Payer: MEDICAID

## 2024-01-06 VITALS
HEART RATE: 81 BPM | RESPIRATION RATE: 16 BRPM | SYSTOLIC BLOOD PRESSURE: 155 MMHG | DIASTOLIC BLOOD PRESSURE: 78 MMHG | TEMPERATURE: 98 F | OXYGEN SATURATION: 100 %

## 2024-01-06 VITALS
DIASTOLIC BLOOD PRESSURE: 71 MMHG | RESPIRATION RATE: 17 BRPM | OXYGEN SATURATION: 99 % | HEART RATE: 83 BPM | SYSTOLIC BLOOD PRESSURE: 146 MMHG | TEMPERATURE: 98 F

## 2024-01-06 DIAGNOSIS — Z98.891 HISTORY OF UTERINE SCAR FROM PREVIOUS SURGERY: Chronic | ICD-10-CM

## 2024-01-06 DIAGNOSIS — Z92.89 PERSONAL HISTORY OF OTHER MEDICAL TREATMENT: Chronic | ICD-10-CM

## 2024-01-06 LAB
ALBUMIN SERPL ELPH-MCNC: 4.2 G/DL — SIGNIFICANT CHANGE UP (ref 3.3–5)
ALBUMIN SERPL ELPH-MCNC: 4.2 G/DL — SIGNIFICANT CHANGE UP (ref 3.3–5)
ALP SERPL-CCNC: 74 U/L — SIGNIFICANT CHANGE UP (ref 40–120)
ALP SERPL-CCNC: 74 U/L — SIGNIFICANT CHANGE UP (ref 40–120)
ALT FLD-CCNC: 15 U/L — SIGNIFICANT CHANGE UP (ref 4–33)
ALT FLD-CCNC: 15 U/L — SIGNIFICANT CHANGE UP (ref 4–33)
ANION GAP SERPL CALC-SCNC: 15 MMOL/L — HIGH (ref 7–14)
ANION GAP SERPL CALC-SCNC: 15 MMOL/L — HIGH (ref 7–14)
APPEARANCE UR: CLEAR — SIGNIFICANT CHANGE UP
APPEARANCE UR: CLEAR — SIGNIFICANT CHANGE UP
AST SERPL-CCNC: 15 U/L — SIGNIFICANT CHANGE UP (ref 4–32)
AST SERPL-CCNC: 15 U/L — SIGNIFICANT CHANGE UP (ref 4–32)
BACTERIA # UR AUTO: NEGATIVE /HPF — SIGNIFICANT CHANGE UP
BACTERIA # UR AUTO: NEGATIVE /HPF — SIGNIFICANT CHANGE UP
BASOPHILS # BLD AUTO: 0.02 K/UL — SIGNIFICANT CHANGE UP (ref 0–0.2)
BASOPHILS # BLD AUTO: 0.02 K/UL — SIGNIFICANT CHANGE UP (ref 0–0.2)
BASOPHILS NFR BLD AUTO: 0.2 % — SIGNIFICANT CHANGE UP (ref 0–2)
BASOPHILS NFR BLD AUTO: 0.2 % — SIGNIFICANT CHANGE UP (ref 0–2)
BILIRUB SERPL-MCNC: 0.3 MG/DL — SIGNIFICANT CHANGE UP (ref 0.2–1.2)
BILIRUB SERPL-MCNC: 0.3 MG/DL — SIGNIFICANT CHANGE UP (ref 0.2–1.2)
BILIRUB UR-MCNC: NEGATIVE — SIGNIFICANT CHANGE UP
BILIRUB UR-MCNC: NEGATIVE — SIGNIFICANT CHANGE UP
BLD GP AB SCN SERPL QL: NEGATIVE — SIGNIFICANT CHANGE UP
BLD GP AB SCN SERPL QL: NEGATIVE — SIGNIFICANT CHANGE UP
BUN SERPL-MCNC: 6 MG/DL — LOW (ref 7–23)
BUN SERPL-MCNC: 6 MG/DL — LOW (ref 7–23)
CALCIUM SERPL-MCNC: 9.2 MG/DL — SIGNIFICANT CHANGE UP (ref 8.4–10.5)
CALCIUM SERPL-MCNC: 9.2 MG/DL — SIGNIFICANT CHANGE UP (ref 8.4–10.5)
CAST: 0 /LPF — SIGNIFICANT CHANGE UP (ref 0–4)
CAST: 0 /LPF — SIGNIFICANT CHANGE UP (ref 0–4)
CHLORIDE SERPL-SCNC: 103 MMOL/L — SIGNIFICANT CHANGE UP (ref 98–107)
CHLORIDE SERPL-SCNC: 103 MMOL/L — SIGNIFICANT CHANGE UP (ref 98–107)
CO2 SERPL-SCNC: 23 MMOL/L — SIGNIFICANT CHANGE UP (ref 22–31)
CO2 SERPL-SCNC: 23 MMOL/L — SIGNIFICANT CHANGE UP (ref 22–31)
COLOR SPEC: YELLOW — SIGNIFICANT CHANGE UP
COLOR SPEC: YELLOW — SIGNIFICANT CHANGE UP
CREAT SERPL-MCNC: 0.61 MG/DL — SIGNIFICANT CHANGE UP (ref 0.5–1.3)
CREAT SERPL-MCNC: 0.61 MG/DL — SIGNIFICANT CHANGE UP (ref 0.5–1.3)
DIFF PNL FLD: ABNORMAL
DIFF PNL FLD: ABNORMAL
EGFR: 114 ML/MIN/1.73M2 — SIGNIFICANT CHANGE UP
EGFR: 114 ML/MIN/1.73M2 — SIGNIFICANT CHANGE UP
EOSINOPHIL # BLD AUTO: 0.02 K/UL — SIGNIFICANT CHANGE UP (ref 0–0.5)
EOSINOPHIL # BLD AUTO: 0.02 K/UL — SIGNIFICANT CHANGE UP (ref 0–0.5)
EOSINOPHIL NFR BLD AUTO: 0.2 % — SIGNIFICANT CHANGE UP (ref 0–6)
EOSINOPHIL NFR BLD AUTO: 0.2 % — SIGNIFICANT CHANGE UP (ref 0–6)
GLUCOSE SERPL-MCNC: 86 MG/DL — SIGNIFICANT CHANGE UP (ref 70–99)
GLUCOSE SERPL-MCNC: 86 MG/DL — SIGNIFICANT CHANGE UP (ref 70–99)
GLUCOSE UR QL: NEGATIVE MG/DL — SIGNIFICANT CHANGE UP
GLUCOSE UR QL: NEGATIVE MG/DL — SIGNIFICANT CHANGE UP
HCG SERPL-ACNC: 2258 MIU/ML — SIGNIFICANT CHANGE UP
HCG SERPL-ACNC: 2258 MIU/ML — SIGNIFICANT CHANGE UP
HCT VFR BLD CALC: 38.2 % — SIGNIFICANT CHANGE UP (ref 34.5–45)
HCT VFR BLD CALC: 38.2 % — SIGNIFICANT CHANGE UP (ref 34.5–45)
HGB BLD-MCNC: 12.2 G/DL — SIGNIFICANT CHANGE UP (ref 11.5–15.5)
HGB BLD-MCNC: 12.2 G/DL — SIGNIFICANT CHANGE UP (ref 11.5–15.5)
IANC: 5.19 K/UL — SIGNIFICANT CHANGE UP (ref 1.8–7.4)
IANC: 5.19 K/UL — SIGNIFICANT CHANGE UP (ref 1.8–7.4)
IMM GRANULOCYTES NFR BLD AUTO: 0.3 % — SIGNIFICANT CHANGE UP (ref 0–0.9)
IMM GRANULOCYTES NFR BLD AUTO: 0.3 % — SIGNIFICANT CHANGE UP (ref 0–0.9)
KETONES UR-MCNC: ABNORMAL MG/DL
KETONES UR-MCNC: ABNORMAL MG/DL
LEUKOCYTE ESTERASE UR-ACNC: ABNORMAL
LEUKOCYTE ESTERASE UR-ACNC: ABNORMAL
LYMPHOCYTES # BLD AUTO: 3.3 K/UL — SIGNIFICANT CHANGE UP (ref 1–3.3)
LYMPHOCYTES # BLD AUTO: 3.3 K/UL — SIGNIFICANT CHANGE UP (ref 1–3.3)
LYMPHOCYTES # BLD AUTO: 36 % — SIGNIFICANT CHANGE UP (ref 13–44)
LYMPHOCYTES # BLD AUTO: 36 % — SIGNIFICANT CHANGE UP (ref 13–44)
MCHC RBC-ENTMCNC: 27.5 PG — SIGNIFICANT CHANGE UP (ref 27–34)
MCHC RBC-ENTMCNC: 27.5 PG — SIGNIFICANT CHANGE UP (ref 27–34)
MCHC RBC-ENTMCNC: 31.9 GM/DL — LOW (ref 32–36)
MCHC RBC-ENTMCNC: 31.9 GM/DL — LOW (ref 32–36)
MCV RBC AUTO: 86.2 FL — SIGNIFICANT CHANGE UP (ref 80–100)
MCV RBC AUTO: 86.2 FL — SIGNIFICANT CHANGE UP (ref 80–100)
MONOCYTES # BLD AUTO: 0.6 K/UL — SIGNIFICANT CHANGE UP (ref 0–0.9)
MONOCYTES # BLD AUTO: 0.6 K/UL — SIGNIFICANT CHANGE UP (ref 0–0.9)
MONOCYTES NFR BLD AUTO: 6.6 % — SIGNIFICANT CHANGE UP (ref 2–14)
MONOCYTES NFR BLD AUTO: 6.6 % — SIGNIFICANT CHANGE UP (ref 2–14)
NEUTROPHILS # BLD AUTO: 5.19 K/UL — SIGNIFICANT CHANGE UP (ref 1.8–7.4)
NEUTROPHILS # BLD AUTO: 5.19 K/UL — SIGNIFICANT CHANGE UP (ref 1.8–7.4)
NEUTROPHILS NFR BLD AUTO: 56.7 % — SIGNIFICANT CHANGE UP (ref 43–77)
NEUTROPHILS NFR BLD AUTO: 56.7 % — SIGNIFICANT CHANGE UP (ref 43–77)
NITRITE UR-MCNC: NEGATIVE — SIGNIFICANT CHANGE UP
NITRITE UR-MCNC: NEGATIVE — SIGNIFICANT CHANGE UP
NRBC # BLD: 0 /100 WBCS — SIGNIFICANT CHANGE UP (ref 0–0)
NRBC # BLD: 0 /100 WBCS — SIGNIFICANT CHANGE UP (ref 0–0)
NRBC # FLD: 0 K/UL — SIGNIFICANT CHANGE UP (ref 0–0)
NRBC # FLD: 0 K/UL — SIGNIFICANT CHANGE UP (ref 0–0)
PH UR: 6.5 — SIGNIFICANT CHANGE UP (ref 5–8)
PH UR: 6.5 — SIGNIFICANT CHANGE UP (ref 5–8)
PLATELET # BLD AUTO: 344 K/UL — SIGNIFICANT CHANGE UP (ref 150–400)
PLATELET # BLD AUTO: 344 K/UL — SIGNIFICANT CHANGE UP (ref 150–400)
POTASSIUM SERPL-MCNC: 3.6 MMOL/L — SIGNIFICANT CHANGE UP (ref 3.5–5.3)
POTASSIUM SERPL-MCNC: 3.6 MMOL/L — SIGNIFICANT CHANGE UP (ref 3.5–5.3)
POTASSIUM SERPL-SCNC: 3.6 MMOL/L — SIGNIFICANT CHANGE UP (ref 3.5–5.3)
POTASSIUM SERPL-SCNC: 3.6 MMOL/L — SIGNIFICANT CHANGE UP (ref 3.5–5.3)
PROT SERPL-MCNC: 7.6 G/DL — SIGNIFICANT CHANGE UP (ref 6–8.3)
PROT SERPL-MCNC: 7.6 G/DL — SIGNIFICANT CHANGE UP (ref 6–8.3)
PROT UR-MCNC: SIGNIFICANT CHANGE UP MG/DL
PROT UR-MCNC: SIGNIFICANT CHANGE UP MG/DL
RBC # BLD: 4.43 M/UL — SIGNIFICANT CHANGE UP (ref 3.8–5.2)
RBC # BLD: 4.43 M/UL — SIGNIFICANT CHANGE UP (ref 3.8–5.2)
RBC # FLD: 15.8 % — HIGH (ref 10.3–14.5)
RBC # FLD: 15.8 % — HIGH (ref 10.3–14.5)
RBC CASTS # UR COMP ASSIST: 123 /HPF — HIGH (ref 0–4)
RBC CASTS # UR COMP ASSIST: 123 /HPF — HIGH (ref 0–4)
RH IG SCN BLD-IMP: POSITIVE — SIGNIFICANT CHANGE UP
RH IG SCN BLD-IMP: POSITIVE — SIGNIFICANT CHANGE UP
SODIUM SERPL-SCNC: 141 MMOL/L — SIGNIFICANT CHANGE UP (ref 135–145)
SODIUM SERPL-SCNC: 141 MMOL/L — SIGNIFICANT CHANGE UP (ref 135–145)
SP GR SPEC: 1.01 — SIGNIFICANT CHANGE UP (ref 1–1.03)
SP GR SPEC: 1.01 — SIGNIFICANT CHANGE UP (ref 1–1.03)
SQUAMOUS # UR AUTO: 1 /HPF — SIGNIFICANT CHANGE UP (ref 0–5)
SQUAMOUS # UR AUTO: 1 /HPF — SIGNIFICANT CHANGE UP (ref 0–5)
UROBILINOGEN FLD QL: 0.2 MG/DL — SIGNIFICANT CHANGE UP (ref 0.2–1)
UROBILINOGEN FLD QL: 0.2 MG/DL — SIGNIFICANT CHANGE UP (ref 0.2–1)
WBC # BLD: 9.16 K/UL — SIGNIFICANT CHANGE UP (ref 3.8–10.5)
WBC # BLD: 9.16 K/UL — SIGNIFICANT CHANGE UP (ref 3.8–10.5)
WBC # FLD AUTO: 9.16 K/UL — SIGNIFICANT CHANGE UP (ref 3.8–10.5)
WBC # FLD AUTO: 9.16 K/UL — SIGNIFICANT CHANGE UP (ref 3.8–10.5)
WBC UR QL: 1 /HPF — SIGNIFICANT CHANGE UP (ref 0–5)
WBC UR QL: 1 /HPF — SIGNIFICANT CHANGE UP (ref 0–5)

## 2024-01-06 PROCEDURE — 76817 TRANSVAGINAL US OBSTETRIC: CPT | Mod: 26

## 2024-01-06 PROCEDURE — 99284 EMERGENCY DEPT VISIT MOD MDM: CPT

## 2024-01-06 NOTE — ED ADULT NURSE NOTE - NSFALLUNIVINTERV_ED_ALL_ED
Bed/Stretcher in lowest position, wheels locked, appropriate side rails in place/Call bell, personal items and telephone in reach/Instruct patient to call for assistance before getting out of bed/chair/stretcher/Non-slip footwear applied when patient is off stretcher/[place] to call system/Physically safe environment - no spills, clutter or unnecessary equipment/Purposeful proactive rounding/Room/bathroom lighting operational, light cord in reach Bed/Stretcher in lowest position, wheels locked, appropriate side rails in place/Call bell, personal items and telephone in reach/Instruct patient to call for assistance before getting out of bed/chair/stretcher/Non-slip footwear applied when patient is off stretcher/[place] to call system/Physically safe environment - no spills, clutter or unnecessary equipment/Purposeful proactive rounding/Room/bathroom lighting operational, light cord in reach

## 2024-01-06 NOTE — ED ADULT TRIAGE NOTE - CHIEF COMPLAINT QUOTE
Pt arrives ambulatory to triage c/o vaginal spotting and abd cramps since Thursday. LMP . Recent + pregnancy test. . PMHx: HTN.

## 2024-01-06 NOTE — ED PROVIDER NOTE - OBJECTIVE STATEMENT
43-year-old female G8, P1 with history of multiple miscarriages some requiring D&C.  Patient presents to ED at 9 weeks gestation by LMP.  Patient has been having vaginal spotting for few days and today noticed bleeding to have some clots.  Patient is also been having back pain for the last 5 days that is occasionally been worse but about the same today.  Patient denies any nausea or vomiting.  Patient denies any urinary complaints.  This is very similar to previous miscarriage symptoms.  Patient did have an ultrasound 5 days ago which showed a gestational sac but no heartbeat and patient was told to come back in 2 weeks for follow-up ultrasound.

## 2024-01-06 NOTE — ED ADULT NURSE NOTE - OBJECTIVE STATEMENT
43 year old female, received to intake. A&Ox4, ambulatory. Respirations equal and unlabored. Past medical history of HTN. Pt states she is approx 8 weeks pregnant and started experiencing vaginal bleeding today. Pt states she is spotting. LMP 11/5. Pt denies cramping, vaginal discharge, urinary symptoms, fevers, chills, SOB, palpitations, dizziness, lightheadedness, any other complaints. Abdomen soft, nontender, nondistended. 22G IV placed to R antecubital space. Labs obtained. Pending US. Safety maintained.

## 2024-01-06 NOTE — ED PROVIDER NOTE - PATIENT PORTAL LINK FT
You can access the FollowMyHealth Patient Portal offered by [facility] by registering at the following website: [email or web address]. By joining [employer]’s FollowMyHealth portal, you will also be able to view your health information using other applications (apps) compatible with our system. You can access the FollowMyHealth Patient Portal offered by [facility] by registering at the following website: [email or web address]. By joining [employer]’s FollowMyHealth portal, you will also be able to view your health information using other applications (apps) compatible with our system.

## 2024-01-06 NOTE — ED PROVIDER NOTE - CLINICAL SUMMARY MEDICAL DECISION MAKING FREE TEXT BOX
43-year-old female who she ate P1 at approximately 9 weeks gestation presents to the ED without a known IUP now with lower abdominal crampy pain and back pain with some vaginal bleeding and clots.  This is concerning for ectopic pregnancy or for threatened AB.  Will check labs and pelvic exam and transvaginal ultrasound.  Patient is seen at [facility] at [address] [place] [address].  Will get results prior to GYN consult if required. 43-year-old female who she ate P1 at approximately 9 weeks gestation presents to the ED without a known IUP now with lower abdominal crampy pain and back pain with some vaginal bleeding and clots.  This is concerning for ectopic pregnancy or for threatened AB.  Will check labs and pelvic exam and transvaginal ultrasound.  Patient is seen at [facility] at [address] [place] [address].  Will get results prior to GYN consult if required.

## 2024-01-06 NOTE — ED PROVIDER NOTE - NSFOLLOWUPINSTRUCTIONS_ED_ALL_ED_FT
[facility]  OB/GYN & Surg  [address] [place] [address]  Phone: [phone]  Fax:       [facility]  Gynceology/OB  [address] [place] [address]  Phone: [phone]  Fax:       [facility]  OB-GYN  [address] [facility] [place] [address]  Phone: [phone]  Fax:       [facility]  OBGYN  [address] [place] [address]  Phone: [phone]  Fax: [phone]  Miscarriage    WHAT YOU NEED TO KNOW:    A miscarriage is the loss of a fetus within the first 20 weeks of pregnancy. A miscarriage may also be called a spontaneous  or an early pregnancy loss.    DISCHARGE INSTRUCTIONS:    Seek care immediately if:    You have foul-smelling drainage or pus coming from your vagina.    You have heavy vaginal bleeding and soak 1 pad or more in an hour.    You have severe abdominal pain.    You feel like your heart is beating faster than normal.    You feel extremely weak or dizzy.  Contact your healthcare provider if:    You have a fever greater than 100.4°F or chills.    You have extreme sadness, grief, or feel unable to cope with what has happened.    You have questions or concerns about your condition or care.  Self-care:    Do not put anything in your vagina for 2 weeks or as directed. Do not use tampons, douche, or have sex. These actions can cause infection and pain.    Use sanitary pads as needed. You may have light bleeding or spotting for 2 weeks.    Do not take a bath or go swimming for 2 weeks or as directed. These actions may increase your risk for an infection. Take showers only.    Rest as needed. Slowly start to do more each day. Return to your daily activities as directed.    Talk to your healthcare provider about birth control. If you would like to prevent another pregnancy, ask your healthcare provider which type of birth control is best for you.    Join a support group or therapy to help you cope. A miscarriage may be very difficult for you, your partner, and other members of your family. There is no right way to feel after a miscarriage. You may feel overwhelming grief or other emotions. It may be helpful to talk to a friend, family member, or counselor about your feelings. You may worry that you could have another miscarriage. Talk to your healthcare provider about your concerns. He may be able to help you reduce the risk for another miscarriage. He may also help you find ways to cope with grief.  For more information:    The [employer] Gy[employer]  [address] [address]  [place] [address]  Phone: [phone]  Phone: [phone]  Web Address: http://www.acog.org  [facility]fects [facility]  [address] [place] [address]  Web Address: [email or web address]  Follow up with your healthcare provider as directed: You may need to see your healthcare provider for blood tests or an ultrasound. Write down your questions so you remember to ask them during your visits.        Aborto natural LO QUE NECESITA SABER:    Un aborto natural es la pérdida del feto antes de la 20ª semana de embarazo. Un aborto natural también se conoce melchor aborto espóntaneo o shaila pérdida temprana del embarazo.    INSTRUCCIONES SOBRE EL DANE HOSPITALARIA:    Busque atención médica de inmediato si:    Tiene secreción o pus malolientes saliendo de gardner vagina.    Usted tiene sangrado vaginal abundante y en el transcurso de shaila hora empapa más de 1 toalla sanitaria.    Usted tiene dolor abdominal intenso.    Usted siente que gardner corazón está latiendo más rápido de lo normal.    Usted se siente sumamente mareado o débil.  Comuníquese con gardner médico si:    Usted tiene shaila temperatura superior a los 100.4°F o escalofrios.    Usted tiene shaila enorme tristeza, small o siente que no puede lidiar con lo que le ha pasado.    Usted tiene preguntas o inquietudes acerca de gardner condición o cuidado.  Cuidados personales:    No se ponga nada dentro de gardner vagina por 2 semanas o según las indicaciones.No use tampones, duchas vaginales ni lleve acabo el acto sexual. Estas acciones pueden causar shaila infección y dolor.    Use toallas sanitarias según las necesidades.Es posible que usted tenga sangrado o manchado leve por 2 semanas.    No tome sukhi de marietta ni vaya a nadar por 2 semanas o según las indicaciones.Estas acciones pueden aumentar gardner riesgo de contraer shaila infección. Sólo tome duchas.    Descanse tanto melchor sea necesario.Empiece a hacer un poco más día a día. Regrese a saúl actividades diarias melchor se le haya indicado.    Consulte con gardner médico sobre los métodos anticonceptivos.En vale que le interese prevenir otro embarazo, pregunte a gardner médico cuál método anticonceptivo le recomienda.    Unirse a un morelia de apoyo o la terapia emocional puede ser beneficioso para afrontar saúl sentimientos.Un aborto natural puede ser muy dificil para usted, gardner darlene y otros miembros de gardner kiet. Después de shaila pérdida del embarazo se pueden sentir muchos sentimientos. Usted podría sentir shaila small intensa u otros sentimientos. Podría ser beneficioso hablar con shaila amiga, con un familiar o con un consejero acerca de saúl sentimientos. Usted también podría estar preocupada por la posibilidad de sufrir otro aborto natural. Consulte con gardner médico acerca de saúl preocupaciones. El médico podría ayudarla a disminuir el riesgo de otro aborto. También le podría ayudar a encontrar formas para sobrellevar la small y aflicción que siente.  Para más información:    The [employer] and Gynecologists  [address] [address]  [place] [address]  Phone: [phone]  Phone: [phone]  Web Address: http://www.acog.org  [facility]  [address] [place] [address]  Web Address: [email or web address]  Acuda a saúl consultas de control con gardner médico según le indicaron.Usted podría necesitar acudir con gardner médico para que le ordene exámenes de nely o shaila ecografía. Anote saúl preguntas para que se acuerde de hacerlas cheryl saúl visitas. [facility]  OB/GYN & Surg  [address] [place] [address]  Phone: [phone]  Fax:       [facility]ne[facility] Obst[facility]  Gynceology/OB  [address] [place] [address]  Phone: [phone]  Fax:       [facility]  OB-GYN  [address] [facility] [place] [address]  Phone: [phone]  Fax:       [facility]  OBGYN  [address] [place] [address]  Phone: [phone]  Fax: [phone]  Miscarriage    WHAT YOU NEED TO KNOW:    A miscarriage is the loss of a fetus within the first 20 weeks of pregnancy. A miscarriage may also be called a spontaneous  or an early pregnancy loss.    DISCHARGE INSTRUCTIONS:    Seek care immediately if:    You have foul-smelling drainage or pus coming from your vagina.    You have heavy vaginal bleeding and soak 1 pad or more in an hour.    You have severe abdominal pain.    You feel like your heart is beating faster than normal.    You feel extremely weak or dizzy.  Contact your healthcare provider if:    You have a fever greater than 100.4°F or chills.    You have extreme sadness, grief, or feel unable to cope with what has happened.    You have questions or concerns about your condition or care.  Self-care:    Do not put anything in your vagina for 2 weeks or as directed. Do not use tampons, douche, or have sex. These actions can cause infection and pain.    Use sanitary pads as needed. You may have light bleeding or spotting for 2 weeks.    Do not take a bath or go swimming for 2 weeks or as directed. These actions may increase your risk for an infection. Take showers only.    Rest as needed. Slowly start to do more each day. Return to your daily activities as directed.    Talk to your healthcare provider about birth control. If you would like to prevent another pregnancy, ask your healthcare provider which type of birth control is best for you.    Join a support group or therapy to help you cope. A miscarriage may be very difficult for you, your partner, and other members of your family. There is no right way to feel after a miscarriage. You may feel overwhelming grief or other emotions. It may be helpful to talk to a friend, family member, or counselor about your feelings. You may worry that you could have another miscarriage. Talk to your healthcare provider about your concerns. He may be able to help you reduce the risk for another miscarriage. He may also help you find ways to cope with grief.  For more information:    The [employer] of Ob[employer]ians and Gynecologists  [address] [address]  [place] [address]  Phone: [phone]  Phone: 1-737.163.4595  Web Address: http://www.acog.org  [facility] Birth Defects [facility]  [address] [place] [address]  Web Address: [email or web address]  Follow up with your healthcare provider as directed: You may need to see your healthcare provider for blood tests or an ultrasound. Write down your questions so you remember to ask them during your visits.        Aborto natural LO QUE NECESITA SABER:    Un aborto natural es la pérdida del feto antes de la 20ª semana de embarazo. Un aborto natural también se conoce melchor aborto espóntaneo o shaila pérdida temprana del embarazo.    INSTRUCCIONES SOBRE EL DANE HOSPITALARIA:    Busque atención médica de inmediato si:    Tiene secreción o pus malolientes saliendo de gardner vagina.    Usted tiene sangrado vaginal abundante y en el transcurso de shaila hora empapa más de 1 toalla sanitaria.    Usted tiene dolor abdominal intenso.    Usted siente que gardner corazón está latiendo más rápido de lo normal.    Usted se siente sumamente mareado o débil.  Comuníquese con gardner médico si:    Usted tiene shaila temperatura superior a los 100.4°F o escalofrios.    Usted tiene shaila enorme tristeza, small o siente que no puede lidiar con lo que le ha pasado.    Usted tiene preguntas o inquietudes acerca de gardner condición o cuidado.  Cuidados personales:    No se ponga nada dentro de gardner vagina por 2 semanas o según las indicaciones.No use tampones, duchas vaginales ni lleve acabo el acto sexual. Estas acciones pueden causar shaila infección y dolor.    Use toallas sanitarias según las necesidades.Es posible que usted tenga sangrado o manchado leve por 2 semanas.    No tome sukhi de marietta ni vaya a nadar por 2 semanas o según las indicaciones.Estas acciones pueden aumentar gardner riesgo de contraer shaila infección. Sólo tome duchas.    Descanse tanto melchor sea necesario.Empiece a hacer un poco más día a día. Regrese a saúl actividades diarias melchor se le haya indicado.    Consulte con gardner médico sobre los métodos anticonceptivos.En vale que le interese prevenir otro embarazo, pregunte a gardner médico cuál método anticonceptivo le recomienda.    Unirse a un morelia de apoyo o la terapia emocional puede ser beneficioso para afrontar saúl sentimientos.Un aborto natural puede ser muy dificil para usted, gardner darlene y otros miembros de gardner kiet. Después de shaila pérdida del embarazo se pueden sentir muchos sentimientos. Usted podría sentir shaila small intensa u otros sentimientos. Podría ser beneficioso hablar con shaila amiga, con un familiar o con un consejero acerca de saúl sentimientos. Usted también podría estar preocupada por la posibilidad de sufrir otro aborto natural. Consulte con gardner médico acerca de saúl preocupaciones. El médico podría ayudarla a disminuir el riesgo de otro aborto. También le podría ayudar a encontrar formas para sobrellevar la small y aflicción que siente.  Para más información:    The [employer] and Gynecologists  [address] [address]  [place] [address]  Phone: [phone]  Phone: [phone]  Web Address: http://www.acog.org  [facility] Birth Defects [facility]  [address] [place] [address]  Web Address: [email or web address]  Acuda a saúl consultas de control con gardner médico según le indicaron.Usted podría necesitar acudir con gardner médico para que le ordene exámenes de nely o shaila ecografía. Anote saúl preguntas para que se acuerde de hacerlas cheryl saúl visitas.

## 2024-01-16 ENCOUNTER — APPOINTMENT (OUTPATIENT)
Dept: OBGYN | Facility: CLINIC | Age: 44
End: 2024-01-16

## 2024-08-13 ENCOUNTER — APPOINTMENT (OUTPATIENT)
Dept: OBGYN | Facility: CLINIC | Age: 44
End: 2024-08-13
Payer: MEDICAID

## 2024-08-13 VITALS
BODY MASS INDEX: 34.2 KG/M2 | HEIGHT: 63 IN | WEIGHT: 193 LBS | HEART RATE: 71 BPM | DIASTOLIC BLOOD PRESSURE: 94 MMHG | SYSTOLIC BLOOD PRESSURE: 141 MMHG

## 2024-08-13 DIAGNOSIS — N92.5 OTHER SPECIFIED IRREGULAR MENSTRUATION: ICD-10-CM

## 2024-08-13 DIAGNOSIS — N93.8 OTHER SPECIFIED IRREGULAR MENSTRUATION: ICD-10-CM

## 2024-08-13 PROCEDURE — 99213 OFFICE O/P EST LOW 20 MIN: CPT

## 2024-08-13 PROCEDURE — 99459 PELVIC EXAMINATION: CPT

## 2024-08-13 NOTE — HISTORY OF PRESENT ILLNESS
[FreeTextEntry1] : pt is now P 0181 ( spont ab earlier this year)  LMP 7/18/24, presents today, for concern over seeing blood when she urinates. . pt denies dysuria, , frequency,  or urgency.

## 2024-08-13 NOTE — DISCUSSION/SUMMARY
[FreeTextEntry1] : A - blood noticed when wiping after urination - etiology unclear  P- Urine C&S and GC/Chlamydia from the urine - done     will contact pt with results.

## 2024-08-13 NOTE — PHYSICAL EXAM
[Chaperone Present] : A chaperone was present in the examining room during all aspects of the physical examination [31464] : A chaperone was present during the pelvic exam. [FreeTextEntry2] : Roberta Brumfield [Labia Majora] : normal [Labia Minora] : normal [Normal] : normal [Uterine Adnexae] : normal

## 2024-08-15 LAB
BACTERIA UR CULT: NORMAL
C TRACH RRNA SPEC QL NAA+PROBE: NOT DETECTED
N GONORRHOEA RRNA SPEC QL NAA+PROBE: NOT DETECTED
SOURCE AMPLIFICATION: NORMAL

## 2024-08-23 NOTE — OB RN INTRAOPERATIVE NOTE - NS_SKININCISION_OBGYN_ALL_OB_DT
Patient resting bed comfortable, but still has abdominal pain with clrs, but otherwise afebrile & stable.  Plan for surgery Monday. Case d/w Dr. Restrepo 06-Jun-2022 23:07

## 2024-08-30 PROBLEM — N91.2 AMENORRHEA: Status: ACTIVE | Noted: 2024-08-30 | Resolved: 2024-11-28

## 2025-07-07 NOTE — OB PROVIDER H&P - NSEARLYLOSS1_OBGYN_ALL_OB
Chronic, uncontrolled.  High stress.  No time to go to the gym  15K steps/day  No dietary changes    7/2025:195#  1/2025: 185#  10/2024: 178#  2/2024: 179#   Spontaneous